# Patient Record
Sex: FEMALE | Race: WHITE | Employment: OTHER | URBAN - METROPOLITAN AREA
[De-identification: names, ages, dates, MRNs, and addresses within clinical notes are randomized per-mention and may not be internally consistent; named-entity substitution may affect disease eponyms.]

---

## 2017-02-21 ENCOUNTER — HOSPITAL ENCOUNTER (OUTPATIENT)
Dept: SURGERY | Age: 72
Discharge: HOME | End: 2017-02-21
Attending: OPHTHALMOLOGY | Admitting: OPHTHALMOLOGY

## 2017-02-21 VITALS
DIASTOLIC BLOOD PRESSURE: 85 MMHG | HEART RATE: 76 BPM | TEMPERATURE: 99.1 F | OXYGEN SATURATION: 100 % | SYSTOLIC BLOOD PRESSURE: 146 MMHG | RESPIRATION RATE: 18 BRPM

## 2017-02-21 RX ORDER — LANOLIN ALCOHOL/MO/W.PET/CERES
5 CREAM (GRAM) TOPICAL NIGHTLY PRN
Status: ON HOLD | COMMUNITY
End: 2022-05-29 | Stop reason: HOSPADM

## 2017-02-21 RX ORDER — SODIUM CHLORIDE 0.9 % (FLUSH) 0.9 %
10 SYRINGE (ML) INJECTION PRN
Status: DISCONTINUED | OUTPATIENT
Start: 2017-02-21 | End: 2017-02-22 | Stop reason: HOSPADM

## 2017-02-21 RX ORDER — SODIUM CHLORIDE 0.9 % (FLUSH) 0.9 %
10 SYRINGE (ML) INJECTION EVERY 12 HOURS SCHEDULED
Status: DISCONTINUED | OUTPATIENT
Start: 2017-02-21 | End: 2017-02-22 | Stop reason: HOSPADM

## 2017-02-21 RX ORDER — SODIUM CHLORIDE, SODIUM LACTATE, POTASSIUM CHLORIDE, CALCIUM CHLORIDE 600; 310; 30; 20 MG/100ML; MG/100ML; MG/100ML; MG/100ML
INJECTION, SOLUTION INTRAVENOUS CONTINUOUS
Status: DISCONTINUED | OUTPATIENT
Start: 2017-02-21 | End: 2017-02-22 | Stop reason: HOSPADM

## 2017-02-21 RX ORDER — LIDOCAINE HYDROCHLORIDE 10 MG/ML
1 INJECTION, SOLUTION EPIDURAL; INFILTRATION; INTRACAUDAL; PERINEURAL
Status: COMPLETED | OUTPATIENT
Start: 2017-02-21 | End: 2017-02-21

## 2017-02-21 RX ADMIN — SODIUM CHLORIDE, SODIUM LACTATE, POTASSIUM CHLORIDE, CALCIUM CHLORIDE: 600; 310; 30; 20 INJECTION, SOLUTION INTRAVENOUS at 08:52

## 2017-02-21 RX ADMIN — LIDOCAINE HYDROCHLORIDE 0.1 ML: 10 INJECTION, SOLUTION EPIDURAL; INFILTRATION; INTRACAUDAL; PERINEURAL at 08:52

## 2017-02-21 ASSESSMENT — PAIN - FUNCTIONAL ASSESSMENT: PAIN_FUNCTIONAL_ASSESSMENT: 0-10

## 2017-02-21 ASSESSMENT — PAIN SCALES - GENERAL: PAINLEVEL_OUTOF10: 0

## 2017-03-21 ENCOUNTER — HOSPITAL ENCOUNTER (OUTPATIENT)
Dept: SURGERY | Age: 72
Discharge: HOME | End: 2017-03-21
Attending: OPHTHALMOLOGY | Admitting: OPHTHALMOLOGY

## 2017-03-21 VITALS
OXYGEN SATURATION: 95 % | TEMPERATURE: 98.1 F | HEART RATE: 68 BPM | WEIGHT: 200 LBS | SYSTOLIC BLOOD PRESSURE: 148 MMHG | BODY MASS INDEX: 32.14 KG/M2 | RESPIRATION RATE: 16 BRPM | HEIGHT: 66 IN | DIASTOLIC BLOOD PRESSURE: 65 MMHG

## 2017-03-21 LAB
GLUCOSE BLD-MCNC: 138 MG/DL (ref 70–99)
PERFORMED ON: ABNORMAL

## 2017-03-21 RX ORDER — METOCLOPRAMIDE HYDROCHLORIDE 5 MG/ML
10 INJECTION INTRAMUSCULAR; INTRAVENOUS
Status: ACTIVE | OUTPATIENT
Start: 2017-03-21 | End: 2017-03-21

## 2017-03-21 RX ORDER — MIDAZOLAM HYDROCHLORIDE 1 MG/ML
1 INJECTION INTRAMUSCULAR; INTRAVENOUS PRN
Status: DISCONTINUED | OUTPATIENT
Start: 2017-03-21 | End: 2017-03-22 | Stop reason: HOSPADM

## 2017-03-21 RX ORDER — SODIUM CHLORIDE 0.9 % (FLUSH) 0.9 %
10 SYRINGE (ML) INJECTION EVERY 12 HOURS SCHEDULED
Status: DISCONTINUED | OUTPATIENT
Start: 2017-03-21 | End: 2017-03-22 | Stop reason: HOSPADM

## 2017-03-21 RX ORDER — LABETALOL HYDROCHLORIDE 5 MG/ML
5 INJECTION, SOLUTION INTRAVENOUS EVERY 10 MIN PRN
Status: DISCONTINUED | OUTPATIENT
Start: 2017-03-21 | End: 2017-03-22 | Stop reason: HOSPADM

## 2017-03-21 RX ORDER — HYDRALAZINE HYDROCHLORIDE 20 MG/ML
5 INJECTION INTRAMUSCULAR; INTRAVENOUS EVERY 10 MIN PRN
Status: DISCONTINUED | OUTPATIENT
Start: 2017-03-21 | End: 2017-03-22 | Stop reason: HOSPADM

## 2017-03-21 RX ORDER — LIDOCAINE HYDROCHLORIDE 10 MG/ML
1 INJECTION, SOLUTION EPIDURAL; INFILTRATION; INTRACAUDAL; PERINEURAL
Status: ACTIVE | OUTPATIENT
Start: 2017-03-21 | End: 2017-03-21

## 2017-03-21 RX ORDER — SODIUM CHLORIDE, SODIUM LACTATE, POTASSIUM CHLORIDE, CALCIUM CHLORIDE 600; 310; 30; 20 MG/100ML; MG/100ML; MG/100ML; MG/100ML
INJECTION, SOLUTION INTRAVENOUS CONTINUOUS
Status: DISCONTINUED | OUTPATIENT
Start: 2017-03-21 | End: 2017-03-22 | Stop reason: HOSPADM

## 2017-03-21 RX ORDER — SODIUM CHLORIDE 0.9 % (FLUSH) 0.9 %
10 SYRINGE (ML) INJECTION PRN
Status: DISCONTINUED | OUTPATIENT
Start: 2017-03-21 | End: 2017-03-22 | Stop reason: HOSPADM

## 2017-03-21 RX ORDER — PROMETHAZINE HYDROCHLORIDE 25 MG/ML
6.25 INJECTION, SOLUTION INTRAMUSCULAR; INTRAVENOUS
Status: ACTIVE | OUTPATIENT
Start: 2017-03-21 | End: 2017-03-21

## 2017-03-21 RX ORDER — MEPERIDINE HYDROCHLORIDE 50 MG/ML
12.5 INJECTION INTRAMUSCULAR; INTRAVENOUS; SUBCUTANEOUS EVERY 5 MIN PRN
Status: DISCONTINUED | OUTPATIENT
Start: 2017-03-21 | End: 2017-03-22 | Stop reason: HOSPADM

## 2017-03-21 RX ORDER — MORPHINE SULFATE 2 MG/ML
1 INJECTION, SOLUTION INTRAMUSCULAR; INTRAVENOUS EVERY 5 MIN PRN
Status: DISCONTINUED | OUTPATIENT
Start: 2017-03-21 | End: 2017-03-22 | Stop reason: HOSPADM

## 2017-03-21 RX ORDER — OXYCODONE HYDROCHLORIDE 5 MG/1
10 TABLET ORAL PRN
Status: ACTIVE | OUTPATIENT
Start: 2017-03-21 | End: 2017-03-21

## 2017-03-21 RX ORDER — OXYCODONE HYDROCHLORIDE 5 MG/1
5 TABLET ORAL PRN
Status: ACTIVE | OUTPATIENT
Start: 2017-03-21 | End: 2017-03-21

## 2017-03-21 RX ORDER — MIDAZOLAM HYDROCHLORIDE 1 MG/ML
INJECTION INTRAMUSCULAR; INTRAVENOUS
Status: COMPLETED
Start: 2017-03-21 | End: 2017-03-21

## 2017-03-21 RX ORDER — DIPHENHYDRAMINE HYDROCHLORIDE 50 MG/ML
12.5 INJECTION INTRAMUSCULAR; INTRAVENOUS
Status: ACTIVE | OUTPATIENT
Start: 2017-03-21 | End: 2017-03-21

## 2017-03-21 RX ADMIN — MIDAZOLAM HYDROCHLORIDE 1 MG: 1 INJECTION INTRAMUSCULAR; INTRAVENOUS at 08:40

## 2017-03-21 RX ADMIN — SODIUM CHLORIDE, SODIUM LACTATE, POTASSIUM CHLORIDE, CALCIUM CHLORIDE: 600; 310; 30; 20 INJECTION, SOLUTION INTRAVENOUS at 08:30

## 2017-03-21 ASSESSMENT — PAIN - FUNCTIONAL ASSESSMENT: PAIN_FUNCTIONAL_ASSESSMENT: 0-10

## 2017-03-21 ASSESSMENT — PAIN SCALES - GENERAL: PAINLEVEL_OUTOF10: 0

## 2018-01-13 PROBLEM — E87.1 HYPONATREMIA: Status: ACTIVE | Noted: 2018-01-13

## 2021-10-07 ENCOUNTER — HOSPITAL ENCOUNTER (OUTPATIENT)
Dept: CT IMAGING | Age: 76
Discharge: HOME | End: 2021-10-07
Payer: MEDICARE

## 2021-10-07 VITALS
RESPIRATION RATE: 14 BRPM | WEIGHT: 200 LBS | HEIGHT: 67 IN | OXYGEN SATURATION: 97 % | BODY MASS INDEX: 31.39 KG/M2 | DIASTOLIC BLOOD PRESSURE: 79 MMHG | HEART RATE: 65 BPM | SYSTOLIC BLOOD PRESSURE: 142 MMHG | TEMPERATURE: 97 F

## 2021-10-07 DIAGNOSIS — N18.32 CHRONIC KIDNEY DISEASE (CKD) STAGE G3B/A3, MODERATELY DECREASED GLOMERULAR FILTRATION RATE (GFR) BETWEEN 30-44 ML/MIN/1.73 SQUARE METER AND ALBUMINURIA CREATININE RATIO GREATER THAN 300 MG/G (HCC): ICD-10-CM

## 2021-10-07 LAB
GLUCOSE BLD-MCNC: 139 MG/DL (ref 70–99)
HCT VFR BLD CALC: 40.8 % (ref 36–48)
HEMOGLOBIN: 13.6 G/DL (ref 12–16)
INR BLD: 1.03 (ref 0.88–1.12)
MCH RBC QN AUTO: 29 PG (ref 26–34)
MCHC RBC AUTO-ENTMCNC: 33.3 G/DL (ref 31–36)
MCV RBC AUTO: 87 FL (ref 80–100)
PDW BLD-RTO: 14.8 % (ref 12.4–15.4)
PERFORMED ON: ABNORMAL
PLATELET # BLD: 394 K/UL (ref 135–450)
PMV BLD AUTO: 7.2 FL (ref 5–10.5)
PROTHROMBIN TIME: 11.6 SEC (ref 9.9–12.7)
RBC # BLD: 4.69 M/UL (ref 4–5.2)
WBC # BLD: 10.6 K/UL (ref 4–11)

## 2021-10-07 PROCEDURE — 7100000010 HC PHASE II RECOVERY - FIRST 15 MIN

## 2021-10-07 PROCEDURE — 6360000002 HC RX W HCPCS: Performed by: STUDENT IN AN ORGANIZED HEALTH CARE EDUCATION/TRAINING PROGRAM

## 2021-10-07 PROCEDURE — 7100000011 HC PHASE II RECOVERY - ADDTL 15 MIN

## 2021-10-07 PROCEDURE — 77012 CT SCAN FOR NEEDLE BIOPSY: CPT

## 2021-10-07 PROCEDURE — 85610 PROTHROMBIN TIME: CPT

## 2021-10-07 PROCEDURE — 2709999900 CT BIOPSY RENAL

## 2021-10-07 PROCEDURE — 85027 COMPLETE CBC AUTOMATED: CPT

## 2021-10-07 PROCEDURE — 88300 SURGICAL PATH GROSS: CPT

## 2021-10-07 RX ORDER — FENTANYL CITRATE 50 UG/ML
INJECTION, SOLUTION INTRAMUSCULAR; INTRAVENOUS
Status: COMPLETED | OUTPATIENT
Start: 2021-10-07 | End: 2021-10-07

## 2021-10-07 RX ORDER — MIDAZOLAM HYDROCHLORIDE 5 MG/ML
INJECTION INTRAMUSCULAR; INTRAVENOUS
Status: COMPLETED | OUTPATIENT
Start: 2021-10-07 | End: 2021-10-07

## 2021-10-07 RX ORDER — HYDRALAZINE HYDROCHLORIDE 20 MG/ML
10 INJECTION INTRAMUSCULAR; INTRAVENOUS ONCE
Status: DISCONTINUED | OUTPATIENT
Start: 2021-10-07 | End: 2021-10-08 | Stop reason: HOSPADM

## 2021-10-07 RX ORDER — HYDRALAZINE HYDROCHLORIDE 20 MG/ML
INJECTION INTRAMUSCULAR; INTRAVENOUS
Status: COMPLETED | OUTPATIENT
Start: 2021-10-07 | End: 2021-10-07

## 2021-10-07 RX ADMIN — MIDAZOLAM HYDROCHLORIDE 1 MG: 5 INJECTION, SOLUTION INTRAMUSCULAR; INTRAVENOUS at 09:14

## 2021-10-07 RX ADMIN — Medication 25 MCG: at 09:25

## 2021-10-07 RX ADMIN — MIDAZOLAM HYDROCHLORIDE 0.5 MG: 5 INJECTION, SOLUTION INTRAMUSCULAR; INTRAVENOUS at 09:25

## 2021-10-07 RX ADMIN — Medication 50 MCG: at 09:14

## 2021-10-07 RX ADMIN — HYDRALAZINE HYDROCHLORIDE 10 MG: 20 INJECTION INTRAMUSCULAR; INTRAVENOUS at 09:12

## 2021-10-07 ASSESSMENT — PAIN SCALES - GENERAL: PAINLEVEL_OUTOF10: 0

## 2021-10-07 ASSESSMENT — PAIN - FUNCTIONAL ASSESSMENT: PAIN_FUNCTIONAL_ASSESSMENT: 0-10

## 2021-10-07 NOTE — SEDATION DOCUMENTATION
Pt arrived for image guided Renal nodule biopsy. Procedure explained including the risk and benefits of the procedure. All questions answered. Pt verbalizes understanding of the of procedure and states no more questions. Consent signed. Vital signs stable, labs, allergies, medications, and code status reviewed. No contraindications noted. Time out completed prior to procedure. Pt was place prone on the CT table. Pt was placed on all cardiac monitoring.        Vitals:    10/07/21 0905   BP: (!) 186/92   Pulse: 73   Resp: 21   Temp:    SpO2: 96%    (vital signs in table format)    Jovanna Score  2 - Able to move 4 extremities voluntarily on command  2 - BP+/- 20mmHg of normal  2 - Fully awake  2 - Able to maintain oxygen saturation >92% on room air  2 - Able to breathe deeply and cough freely    Allergies  Sulfamethoxazole-trimethoprim    Labs  Lab Results   Component Value Date    INR 1.03 10/07/2021    PROTIME 11.6 10/07/2021       Lab Results   Component Value Date    CREATININE 0.9 01/17/2018    BUN 17 01/17/2018     (L) 01/17/2018    K 4.6 01/17/2018    CL 96 (L) 01/17/2018    CO2 23 01/17/2018       Lab Results   Component Value Date    WBC 10.6 10/07/2021    HGB 13.6 10/07/2021    HCT 40.8 10/07/2021    MCV 87.0 10/07/2021     10/07/2021

## 2021-10-07 NOTE — SEDATION DOCUMENTATION
Image guided Renal biopsy completed by Dr. Cecelia Vasquez. Pt tolerated procedure without any signs or symptoms of distress. Vital signs stable. Report given  to Israel Wesley RN. Pt transported back to [facility] in stable condition via stretcher. Total Biopsy: 3  Received: Versed: 1.5 mg       Fentanyl: 75 mcg  Bandage to LEFT that is clean dry and intact. Patient to lay on back for 1 hour. Can d/c in 2 hours.  Please keep BP below 160 if goes above 160 please call for further orders    Vital Signs  Vitals:    10/07/21 0945   BP: (!) 155/78   Pulse: 79   Resp: 16   Temp:    SpO2: 94%    (vital signs in table format)    Post Jovanna  2 - Able to move 4 extremities voluntarily on command  2 - BP+/- 20mmHg of normal  2 - Fully awake  2 - Able to maintain oxygen saturation >92% on room air  2 - Able to breathe deeply and cough freely

## 2021-10-07 NOTE — BRIEF OP NOTE
Brief Postoperative Note    Nick Glynn  YOB: 1945  [id number]    Pre-operative Diagnosis: CKD    Post-operative Diagnosis: Same    Procedure: CT guided left renal biopsy    Anesthesia: Local and Moderate Sedation    Surgeons/Assistants: Arron Rush MD    Estimated Blood Loss: less than 5    Complications: None    Specimens: Was Obtained: 3 samples    Findings: Successful CT guided biopsy of left kidney    Plan:   - strict bedrest for 2 hours    Electronically signed by Arron Rush MD on 10/7/2021 at 9:48 AM

## 2021-10-07 NOTE — PRE SEDATION
Sedation Pre-Procedure Note    Patient Name: Chance Duffy   YOB: 1945  Room/Bed: Room/bed info not found  Medical Record Number: [id number]  Date: 10/7/2021   Time: 9:05 AM       Indication:  Pt is a 75y/o F with CKD here for random renal biopsy. Consent: I have discussed with the patient and/or the patient representative the indication, alternatives, and the possible risks and/or complications of the planned procedure and the anesthesia methods. The patient and/or patient representative appear to understand and agree to proceed. Vital Signs:   Vitals:    10/07/21 0738   BP: (!) 147/74   Pulse: 82   Resp: 16   Temp: 97 °F (36.1 °C)   SpO2: 94%       Past Medical History:   has a past medical history of Arthritis, Back complaints, Diabetes mellitus ([address] Hypertension, and Spinal stenosis. Past Surgical History:   has a past surgical history that includes Hysterectomy; joint replacement (Left); Cataract removal with implant (Left, 02/21/2017); and eye surgery (Right, 03/21/2017). Medications:   Scheduled Meds:   Continuous Infusions:   PRN Meds:   Home Meds:   Prior to Admission medications    Medication Sig Start Date End Date Taking? Authorizing Provider   amLODIPine (NORVASC) 5 MG tablet Take 1 tablet by mouth daily 1/18/18  Yes Yenifer Lacey MD   hydrOXYzine (ATARAX) 25 MG tablet Take 25 mg by mouth 2 times daily   Yes Historical Provider, MD   pravastatin (PRAVACHOL) 40 MG tablet Take 40 mg by mouth daily   Yes Historical Provider, MD   tiZANidine (ZANAFLEX) 4 MG tablet Take 4 mg by mouth every 6 hours as needed   Yes Historical Provider, MD   melatonin 3 MG TABS tablet Take 5 mg by mouth nightly as needed   Yes Historical Provider, MD   naloxone 4 MG/0.1ML LIQD nasal spray 1 spray by Nasal route as needed for Opioid Reversal 2/18/19   EVAN Graham - CNP   HYDROcodone-acetaminophen (NORCO) 5-325 MG per tablet Take 1 tablet by mouth every 6 hours as needed for Pain. Historical Provider, MD   cephALEXin (KEFLEX) 250 MG capsule Take 250 mg by mouth 4 times daily    Historical Provider, MD   Omega-3 Fatty Acids (FISH OIL PO) Take by mouth daily    Historical Provider, MD   metFORMIN (GLUCOPHAGE) 500 MG tablet Take 500 mg by mouth 2 times daily (with meals)    Historical Provider, MD   aspirin 81 MG tablet Take 81 mg by mouth daily    Historical Provider, MD     Coumadin Use Last 7 Days:  no  Antiplatelet drug therapy use last 7 days: no  Other anticoagulant use last 7 days: no  Additional Medication Information:  None      Pre-Sedation Documentation and Exam:   I have reviewed the patient's history and review of systems.     Mallampati Airway Assessment:  Mallampati Class II - (soft palate, fauces & uvula are visible)    Prior History of Anesthesia Complications:   none    ASA Classification:  Class 3 - A patient with severe systemic disease that limits activity but is not incapacitating    Sedation/ Anesthesia Plan:   intravenous sedation    Medications Planned:   midazolam (Versed) intravenously and fentanyl intravenously    Patient is an appropriate candidate for plan of sedation: yes    Electronically signed by Maribeth Peterson MD on 10/7/2021 at 9:05 AM

## 2021-10-07 NOTE — PROGRESS NOTES
D: pt back to [facility] from interventional radiology procedure. VSS, denies pain or nausea, aware of flat bedrest for 1 hour post procedure. L posterior flank dressing has small bloody shadowy drainage visible. Will continue to monitor.

## 2021-10-07 NOTE — PROGRESS NOTES
D: bedrest ended. Pt sitting up in bed, drinking diet ginger ale and eating jello, denies nausea or pain. Dressing drainage unchanged, VSS.

## 2022-05-26 ENCOUNTER — APPOINTMENT (OUTPATIENT)
Dept: GENERAL RADIOLOGY | Age: 77
DRG: 683 | End: 2022-05-26
Payer: MEDICARE

## 2022-05-26 ENCOUNTER — HOSPITAL ENCOUNTER (INPATIENT)
Age: 77
LOS: 3 days | Discharge: NURSING, REHAB OR LONG-TERM CARE | DRG: 683 | End: 2022-05-29
Attending: EMERGENCY MEDICINE | Admitting: INTERNAL MEDICINE
Payer: MEDICARE

## 2022-05-26 DIAGNOSIS — R62.7 FAILURE TO THRIVE IN ADULT: ICD-10-CM

## 2022-05-26 DIAGNOSIS — N17.9 AKI (ACUTE KIDNEY INJURY) (HCC): Primary | ICD-10-CM

## 2022-05-26 DIAGNOSIS — E86.0 DEHYDRATION: ICD-10-CM

## 2022-05-26 DIAGNOSIS — R77.8 ELEVATED TROPONIN: ICD-10-CM

## 2022-05-26 PROBLEM — R53.1 GENERAL WEAKNESS: Status: ACTIVE | Noted: 2022-05-26

## 2022-05-26 LAB
A/G RATIO: 1.7 (ref 1.1–2.2)
ALBUMIN SERPL-MCNC: 4.2 G/DL (ref 3.4–5)
ALP BLD-CCNC: 101 U/L (ref 40–129)
ALT SERPL-CCNC: 12 U/L (ref 10–40)
ANION GAP SERPL CALCULATED.3IONS-SCNC: 14 MMOL/L (ref 3–16)
AST SERPL-CCNC: 19 U/L (ref 15–37)
BASOPHILS ABSOLUTE: 0 K/UL (ref 0–0.2)
BASOPHILS RELATIVE PERCENT: 0.4 %
BILIRUB SERPL-MCNC: <0.2 MG/DL (ref 0–1)
BILIRUBIN URINE: NEGATIVE
BLOOD, URINE: ABNORMAL
BUN BLDV-MCNC: 37 MG/DL (ref 7–20)
CALCIUM SERPL-MCNC: 9.8 MG/DL (ref 8.3–10.6)
CHLORIDE BLD-SCNC: 98 MMOL/L (ref 99–110)
CLARITY: CLEAR
CO2: 20 MMOL/L (ref 21–32)
COLOR: YELLOW
CREAT SERPL-MCNC: 1.7 MG/DL (ref 0.6–1.2)
EKG ATRIAL RATE: 88 BPM
EKG DIAGNOSIS: NORMAL
EKG P AXIS: 17 DEGREES
EKG P-R INTERVAL: 152 MS
EKG Q-T INTERVAL: 354 MS
EKG QRS DURATION: 82 MS
EKG QTC CALCULATION (BAZETT): 428 MS
EKG R AXIS: -24 DEGREES
EKG T AXIS: 82 DEGREES
EKG VENTRICULAR RATE: 88 BPM
EOSINOPHILS ABSOLUTE: 0.2 K/UL (ref 0–0.6)
EOSINOPHILS RELATIVE PERCENT: 3.1 %
EPITHELIAL CELLS, UA: ABNORMAL /HPF (ref 0–5)
FINE CASTS, UA: ABNORMAL /LPF (ref 0–2)
GFR AFRICAN AMERICAN: 35
GFR NON-AFRICAN AMERICAN: 29
GLUCOSE BLD-MCNC: 141 MG/DL (ref 70–99)
GLUCOSE BLD-MCNC: 174 MG/DL (ref 70–99)
GLUCOSE URINE: NEGATIVE MG/DL
HCT VFR BLD CALC: 35.2 % (ref 36–48)
HEMOGLOBIN: 11.5 G/DL (ref 12–16)
HYALINE CASTS: ABNORMAL /LPF (ref 0–2)
KETONES, URINE: NEGATIVE MG/DL
LEUKOCYTE ESTERASE, URINE: NEGATIVE
LYMPHOCYTES ABSOLUTE: 1.9 K/UL (ref 1–5.1)
LYMPHOCYTES RELATIVE PERCENT: 23.7 %
MCH RBC QN AUTO: 28.4 PG (ref 26–34)
MCHC RBC AUTO-ENTMCNC: 32.8 G/DL (ref 31–36)
MCV RBC AUTO: 86.6 FL (ref 80–100)
MICROSCOPIC EXAMINATION: YES
MONOCYTES ABSOLUTE: 0.9 K/UL (ref 0–1.3)
MONOCYTES RELATIVE PERCENT: 11.2 %
MUCUS: ABNORMAL /LPF
NEUTROPHILS ABSOLUTE: 4.9 K/UL (ref 1.7–7.7)
NEUTROPHILS RELATIVE PERCENT: 61.6 %
NITRITE, URINE: NEGATIVE
PDW BLD-RTO: 13.9 % (ref 12.4–15.4)
PERFORMED ON: ABNORMAL
PH UA: 5.5 (ref 5–8)
PLATELET # BLD: 442 K/UL (ref 135–450)
PMV BLD AUTO: 7.5 FL (ref 5–10.5)
POTASSIUM SERPL-SCNC: 5 MMOL/L (ref 3.5–5.1)
PRO-BNP: 485 PG/ML (ref 0–449)
PROTEIN UA: 100 MG/DL
RBC # BLD: 4.06 M/UL (ref 4–5.2)
RBC UA: ABNORMAL /HPF (ref 0–4)
RENAL EPITHELIAL, UA: ABNORMAL /HPF (ref 0–1)
SODIUM BLD-SCNC: 132 MMOL/L (ref 136–145)
SPECIFIC GRAVITY UA: 1.01 (ref 1–1.03)
TOTAL PROTEIN: 6.7 G/DL (ref 6.4–8.2)
TROPONIN: 0.03 NG/ML
TROPONIN: 0.04 NG/ML
URINE REFLEX TO CULTURE: ABNORMAL
URINE TYPE: ABNORMAL
UROBILINOGEN, URINE: 0.2 E.U./DL
WBC # BLD: 8 K/UL (ref 4–11)
WBC UA: ABNORMAL /HPF (ref 0–5)

## 2022-05-26 PROCEDURE — 97530 THERAPEUTIC ACTIVITIES: CPT

## 2022-05-26 PROCEDURE — 2580000003 HC RX 258: Performed by: PHYSICIAN ASSISTANT

## 2022-05-26 PROCEDURE — 93010 ELECTROCARDIOGRAM REPORT: CPT | Performed by: INTERNAL MEDICINE

## 2022-05-26 PROCEDURE — 97535 SELF CARE MNGMENT TRAINING: CPT

## 2022-05-26 PROCEDURE — 1200000000 HC SEMI PRIVATE

## 2022-05-26 PROCEDURE — 71045 X-RAY EXAM CHEST 1 VIEW: CPT

## 2022-05-26 PROCEDURE — 83036 HEMOGLOBIN GLYCOSYLATED A1C: CPT

## 2022-05-26 PROCEDURE — 97166 OT EVAL MOD COMPLEX 45 MIN: CPT

## 2022-05-26 PROCEDURE — 97162 PT EVAL MOD COMPLEX 30 MIN: CPT

## 2022-05-26 PROCEDURE — 85025 COMPLETE CBC W/AUTO DIFF WBC: CPT

## 2022-05-26 PROCEDURE — 81001 URINALYSIS AUTO W/SCOPE: CPT

## 2022-05-26 PROCEDURE — 99285 EMERGENCY DEPT VISIT HI MDM: CPT

## 2022-05-26 PROCEDURE — 80053 COMPREHEN METABOLIC PANEL: CPT

## 2022-05-26 PROCEDURE — 97116 GAIT TRAINING THERAPY: CPT

## 2022-05-26 PROCEDURE — 84484 ASSAY OF TROPONIN QUANT: CPT

## 2022-05-26 PROCEDURE — 83880 ASSAY OF NATRIURETIC PEPTIDE: CPT

## 2022-05-26 PROCEDURE — 6370000000 HC RX 637 (ALT 250 FOR IP): Performed by: INTERNAL MEDICINE

## 2022-05-26 PROCEDURE — 2580000003 HC RX 258: Performed by: INTERNAL MEDICINE

## 2022-05-26 PROCEDURE — 36415 COLL VENOUS BLD VENIPUNCTURE: CPT

## 2022-05-26 PROCEDURE — 93005 ELECTROCARDIOGRAM TRACING: CPT | Performed by: PHYSICIAN ASSISTANT

## 2022-05-26 RX ORDER — SODIUM CHLORIDE 9 MG/ML
INJECTION, SOLUTION INTRAVENOUS PRN
Status: DISCONTINUED | OUTPATIENT
Start: 2022-05-26 | End: 2022-05-29 | Stop reason: HOSPADM

## 2022-05-26 RX ORDER — ACETAMINOPHEN 325 MG/1
650 TABLET ORAL EVERY 6 HOURS PRN
Status: DISCONTINUED | OUTPATIENT
Start: 2022-05-26 | End: 2022-05-29 | Stop reason: HOSPADM

## 2022-05-26 RX ORDER — AMLODIPINE BESYLATE 5 MG/1
5 TABLET ORAL DAILY
Status: DISCONTINUED | OUTPATIENT
Start: 2022-05-26 | End: 2022-05-29 | Stop reason: HOSPADM

## 2022-05-26 RX ORDER — SODIUM CHLORIDE 9 MG/ML
INJECTION, SOLUTION INTRAVENOUS CONTINUOUS
Status: ACTIVE | OUTPATIENT
Start: 2022-05-26 | End: 2022-05-27

## 2022-05-26 RX ORDER — ACETAMINOPHEN 650 MG/1
650 SUPPOSITORY RECTAL EVERY 6 HOURS PRN
Status: DISCONTINUED | OUTPATIENT
Start: 2022-05-26 | End: 2022-05-29 | Stop reason: HOSPADM

## 2022-05-26 RX ORDER — SODIUM CHLORIDE 0.9 % (FLUSH) 0.9 %
5-40 SYRINGE (ML) INJECTION PRN
Status: DISCONTINUED | OUTPATIENT
Start: 2022-05-26 | End: 2022-05-29 | Stop reason: HOSPADM

## 2022-05-26 RX ORDER — POLYETHYLENE GLYCOL 3350 17 G/17G
17 POWDER, FOR SOLUTION ORAL DAILY PRN
Status: DISCONTINUED | OUTPATIENT
Start: 2022-05-26 | End: 2022-05-29 | Stop reason: HOSPADM

## 2022-05-26 RX ORDER — LANOLIN ALCOHOL/MO/W.PET/CERES
4.5 CREAM (GRAM) TOPICAL NIGHTLY PRN
Status: DISCONTINUED | OUTPATIENT
Start: 2022-05-26 | End: 2022-05-29 | Stop reason: HOSPADM

## 2022-05-26 RX ORDER — ONDANSETRON 4 MG/1
4 TABLET, ORALLY DISINTEGRATING ORAL EVERY 8 HOURS PRN
Status: DISCONTINUED | OUTPATIENT
Start: 2022-05-26 | End: 2022-05-29 | Stop reason: HOSPADM

## 2022-05-26 RX ORDER — ONDANSETRON 2 MG/ML
4 INJECTION INTRAMUSCULAR; INTRAVENOUS EVERY 6 HOURS PRN
Status: DISCONTINUED | OUTPATIENT
Start: 2022-05-26 | End: 2022-05-29 | Stop reason: HOSPADM

## 2022-05-26 RX ORDER — HYDROXYZINE HYDROCHLORIDE 10 MG/1
25 TABLET, FILM COATED ORAL 2 TIMES DAILY
Status: DISCONTINUED | OUTPATIENT
Start: 2022-05-26 | End: 2022-05-27 | Stop reason: DRUGHIGH

## 2022-05-26 RX ORDER — PRAVASTATIN SODIUM 40 MG
40 TABLET ORAL DAILY
Status: DISCONTINUED | OUTPATIENT
Start: 2022-05-27 | End: 2022-05-29 | Stop reason: HOSPADM

## 2022-05-26 RX ORDER — 0.9 % SODIUM CHLORIDE 0.9 %
1000 INTRAVENOUS SOLUTION INTRAVENOUS ONCE
Status: COMPLETED | OUTPATIENT
Start: 2022-05-26 | End: 2022-05-26

## 2022-05-26 RX ORDER — SODIUM CHLORIDE 0.9 % (FLUSH) 0.9 %
5-40 SYRINGE (ML) INJECTION EVERY 12 HOURS SCHEDULED
Status: DISCONTINUED | OUTPATIENT
Start: 2022-05-26 | End: 2022-05-29 | Stop reason: HOSPADM

## 2022-05-26 RX ORDER — ENOXAPARIN SODIUM 100 MG/ML
30 INJECTION SUBCUTANEOUS DAILY
Status: DISCONTINUED | OUTPATIENT
Start: 2022-05-27 | End: 2022-05-29 | Stop reason: HOSPADM

## 2022-05-26 RX ADMIN — AMLODIPINE BESYLATE 5 MG: 5 TABLET ORAL at 18:42

## 2022-05-26 RX ADMIN — HYDROXYZINE HYDROCHLORIDE 25 MG: 10 TABLET ORAL at 20:36

## 2022-05-26 RX ADMIN — SODIUM CHLORIDE 1000 ML: 9 INJECTION, SOLUTION INTRAVENOUS at 17:33

## 2022-05-26 RX ADMIN — SODIUM CHLORIDE: 9 INJECTION, SOLUTION INTRAVENOUS at 18:17

## 2022-05-26 ASSESSMENT — ENCOUNTER SYMPTOMS
BACK PAIN: 0
VOMITING: 0
NAUSEA: 0
CHEST TIGHTNESS: 0
SHORTNESS OF BREATH: 0
ABDOMINAL PAIN: 0
COUGH: 0

## 2022-05-26 ASSESSMENT — PAIN - FUNCTIONAL ASSESSMENT: PAIN_FUNCTIONAL_ASSESSMENT: NONE - DENIES PAIN

## 2022-05-26 NOTE — ED NOTES
Patient identified as a positive fall risk on the ED triage fall screening. Patient placed in fall precautions which includes:  yellow fall risk bracelet on wrist and yellow socks on feet. Patient instructed on importance of not getting out of bed or ambulating without assistance for safety. Pt verbalized understanding.        Zandra So RN  05/26/22 3699

## 2022-05-26 NOTE — ED NOTES
Pt scores as a fall risk. Pt has non skid socks, fall band and bed alarm in use. Pt call light within reach. Pt alert and able to verbalize needs and follow commands at this time.       Yair Bolden RN  05/26/22 6326

## 2022-05-26 NOTE — PROGRESS NOTES
Occupational Therapy  Facility/Department: [address] [place] [address]  Occupational Therapy Initial Assessment/Treatment    Name: Lynnette Barry  : 1945  MRN: [id number]  Date of Service: 2022    Discharge Recommendations:  [address]          Patient Diagnosis(es): The primary encounter diagnosis was DANNY (acute kidney injury) ([facility] Diagnoses of Dehydration, Failure to thrive in adult, and Elevated troponin were also pertinent to this visit. Past Medical History:  has a past medical history of Arthritis, Back complaints, Diabetes mellitus ([address] Hypertension, and Spinal stenosis. Past Surgical History:  has a past surgical history that includes Hysterectomy; joint replacement (Left); Cataract removal with implant (Left, 2017); eye surgery (Right, 2017); and CT BIOPSY RENAL (10/7/2021). Assessment   Performance deficits / Impairments: Decreased functional mobility ; Decreased ADL status; Decreased strength;Decreased safe awareness;Decreased cognition;Decreased balance;Decreased sensation;Decreased endurance;Decreased posture    Assessment: Pt is a 68yo female with reports to [facility] with generalized weakness after not being able to t/f off of the toilet. Pt reports mod I for functional mobility with 4WW and only recieves assistance with shower t/fs and showering as well as IADLS. Today, Pt performing functional t/fs mod-min A with RW. Pt initially mod Ax2 with HHA for mobility but progressing to CGA with RW. Pt requiring min A for toileting this date and CGA for standing self cares. Pt would continue to benefit from skilled OT services to increase balance, safety, endurance, strength and independence for ADLS and functional mobility.     Prognosis: Good  Decision Making: Medium Complexity  REQUIRES OT FOLLOW-UP: Yes  Activity Tolerance  Activity Tolerance: Patient Tolerated treatment well  Activity Tolerance Comments: Pt with increase SOB and fatigue with OOB mobility. HR increasing to 114 with activity. Plan   Plan  Times per Week: 3-5x/week  Current Treatment Recommendations: Strengthening,Balance training,Functional mobility training,Endurance training,Cognitive reorientation,Safety education & training,Patient/Caregiver education & training,Equipment evaluation, education, & procurement,Self-Care / ADL     Restrictions  Restrictions/Precautions  Restrictions/Precautions: General Precautions,Fall Risk  Position Activity Restriction  Other position/activity restrictions: RN cleared pt for therapy and OOB activity    Subjective   General  Chart Reviewed: Yes  Patient assessed for rehabilitation services?: Yes  Additional Pertinent Hx: dementia  Response to previous treatment: Patient with no complaints from previous session  Family / Caregiver Present: No  Referring Practitioner: SRAVANTHI Krueger  Diagnosis: generalized weakness  Subjective  Subjective: Pt in restroom with RN at start of session. Pt agreeable to therapy.   General Comment  Comments: RN approved therapy     Social/Functional History  Social/Functional History  Lives With: Son (Son cannot assist 24/7)  Type of Home: House  Home Layout: One level,Laundry in basement  Home Access: Ramped entrance  [address] - Number of Steps: 3  Entrance Stairs - Rails: Left  Bathroom Shower/Tub: Tub/Shower unit,Shower chair with back  Bathroom Toilet: Standard  Bathroom Equipment: Grab bars in shower,Shower chair,Grab bars around toilet  Home Equipment: Gloria Dem[place]ice, 4 wheeled,Cane, [place]  Has the patient had two or more falls in the past year or any fall with injury in the past year?: No  Receives Help From: Family  ADL Assistance: Needs assistance (Pt's daughter assist with bathing with varying levels assistance.)  Toileting: Independent  Homemaking Assistance: Needs assistance  Homemaking Responsibilities: Yes  Meal Prep Responsibility: Secondary (Uses microwave.)  Laundry Responsibility: No (daughter performs.)  Cleaning Responsibility: No (cleaning services.)  Shopping Responsibility: No (daughter performs.)  Ambulation Assistance: Independent (1AB used.)  Transfer Assistance: Needs assistance (Daughter assist with shower t/fs. Pt typically mod (i) with toilet t/fs. Pt reports to [facility] after being unable to stand up from toilet)  Active : No  Patient's  Info: daughter drives. Additional Comments: daughter can provide 24hr A if needed per pt. Pt may be a questionable historian. Per chart, pt has dementia. Objective   Heart Rate: 87  Heart Rate Source: Monitor  BP: (!) 160/76  BP Location: Right upper arm  Patient Position: Sitting  MAP (Calculated): 104  Resp: 18  SpO2: 93 %  O2 Device: None (Room air)  Vision Exceptions: Wears glasses for reading  Hearing: Within functional limits          Safety Devices  Type of Devices: Gait belt;Nurse notified; Left in bed (Pt left seated EOB with PT in room to perform PT evaluation.)  Bed Mobility Training  Bed Mobility Training: No  Balance  Sitting: Impaired  Sitting - Static: Good (unsupported)  Sitting - Dynamic: Fair (occasional) (CGA)  Standing: Impaired  Standing - Static: Occasional;Fair  Standing - Dynamic: Occasional;Fair (No AD, mod Ax2 with HHA and poor posture and safety awareness. With RW, CGA. Activities: to/from restroom, standing self-cares at sink, functional t/fs.)  Transfer Training  Transfer Training: Yes  Interventions: Verbal cues; Safety awareness training (RW used, vc's for safe hand placement.)  Sit to Stand:  Moderate assistance;Assist X1  Stand to Sit: Moderate assistance;Assist X1  Toilet Transfer: Minimum assistance;Assist X1 (use of grab bar on R and RW.)     AROM: Generally decreased, functional  PROM: Generally decreased, functional  Strength: Generally decreased, functional  Coordination: Within functional limits  Tone: Normal  Sensation: Impaired (B/L N/T in feet.) ADL  Grooming: Contact guard assistance (in stance at sink for hand hygiene.)  Toileting: Minimal assistance  Toileting Skilled Clinical Factors: on standard toilet with grab bar on R. No LB clothing management. Assist with t/fs     Activity Tolerance  Activity Tolerance: Patient tolerated evaluation without incident;Patient tolerated treatment well           Cognition  Overall Cognitive Status: Exceptions  Arousal/Alertness: Appropriate responses to stimuli  Following Commands: Follows one step commands with repetition  Attention Span: Attends with cues to redirect  Memory: Decreased short term memory  Safety Judgement: Decreased awareness of need for safety;Decreased awareness of need for assistance  Problem Solving: Decreased awareness of errors;Assistance required to identify errors made  Insights: Decreased awareness of deficits  Initiation: Requires cues for some  Sequencing: Requires cues for some   Ox2 - self and place                  Education Given To: Patient  Education Provided: Role of Therapy;Plan of Care;Transfer Training; Fall Prevention Strategies  Education Provided Comments: disease specific: safe t/fs  Education Method: Demonstration;Verbal  Barriers to Learning: Cognition  Education Outcome: Demonstrated understanding;Continued education needed;Verbalized understanding         AM-PAC Score        -[facility] Inpatient Daily Activity Raw Score: 17 (05/26/22 1528)  AM-PAC Inpatient ADL T-Scale Score : 37.26 (05/26/22 1528)  ADL Inpatient CMS 0-100% Score: 50.11 (05/26/22 1528)  ADL Inpatient CMS G-Code Modifier : CK (05/26/22 1528)    Goals  Short Term Goals  Time Frame for Short term goals: 1 week (6/2) unless stated otherwise. Short Term Goal 1: Pt will toilet with SBA and AD PRN. Short Term Goal 2: Pt will LB dress with Yuli and AD PRN (5/31). Short Term Goal 3: Pt will perform functional/toilet t/fs with SBA and AD PRN.   Patient Goals   Patient goals : \"to use the restroom\"       Therapy Time   Individual Concurrent Group Co-treatment   Time In 1432         Time Out 1505         Minutes 33         Timed Code Treatment Minutes: 23 Minutes (10minute evaluation)       Lawrence Carlisle OTR/L  If pt discharges prior to next session, this note will serve as discharge summary. See case management note for discharge disposition.

## 2022-05-26 NOTE — PROGRESS NOTES
Physical Therapy  Facility/Department: [address] [place] [address]  Physical Therapy Initial Assessment    Name: Jesus Salvador  : 1945  MRN: [id number]  Date of Service: 2022    Discharge Recommendations:  [address]        If pt discharges prior to next PT session this note will serve as discharge summary. Patient Diagnosis(es): The primary encounter diagnosis was DANNY (acute kidney injury) ([address] Diagnoses of Dehydration, Failure to thrive in adult, and Elevated troponin were also pertinent to this visit. Past Medical History:  has a past medical history of Arthritis, Back complaints, Diabetes mellitus ([address] Hypertension, and Spinal stenosis. Past Surgical History:  has a past surgical history that includes Hysterectomy; joint replacement (Left); Cataract removal with implant (Left, 2017); eye surgery (Right, 2017); and CT BIOPSY RENAL (10/7/2021). Assessment   Body Structures, Functions, Activity Limitations Requiring Skilled Therapeutic Intervention: Decreased functional mobility ; Increased pain;Decreased strength;Decreased endurance;Decreased safe awareness  Assessment: 67 yo fmeale seen in Emergency Dept with worsening weakness, abnormal labs. PLOF: lives with son, ramp to home, amb with 4WW indep. Son reporting increased weakness, diffiuclty arising from toilet. Today pt essentially min assist to CG for bed mob/trasnfers/ambulation, with early fatigue noted. Pt functioning below baseline and will benefit from skilled PT to address deficits. Recommend home SNF at discharge.   Treatment Diagnosis: weakness, generalized pain, decreased gait and mobility  Therapy Prognosis: Good  Decision Making: Medium Complexity  Barriers to Learning: decreased memory  Requires PT Follow-Up: Yes  Activity Tolerance  Activity Tolerance: Patient tolerated evaluation without incident;Patient tolerated treatment well     Plan   Plan: 3-5 times per week  Current Treatment Recommendations: Strengthening,Balance training,Functional mobility training,Transfer training,Gait training,Endurance training,Safety education & training,Pain management,Therapeutic activities  Safety Devices  Type of Devices:  All fall risk precautions in place,Call light within reach,Left in bed,Patient at risk for falls,Gait belt,Nurse notified     Restrictions  Restrictions/Precautions: General Precautions,Fall Risk  Other position/activity restrictions: RN cleared pt for therapy and OOB activity     Subjective   Chart Reviewed: Yes  Patient assessed for rehabilitation services?: Yes  Family / Caregiver Present: No  Referring Practitioner: Ruchi FISHMAN  Referral Date : 05/26/22  Diagnosis: worsening weakness and dementia, multiple abnormal lab values  Follows Commands: Within Functional Limits  General Comment  Comments: RN cleared pt for therapy  Subjective  Subjective: Pt agrees to therapy, reports \"uncomfortable\" chronic generalized pain         Social/Functional History  Social/Functional History  Lives With: Son (Son cannot assist 24/7)  Type of Home: House  Home Layout: One level,Laundry in basement  Home Access: Ramped entrance  [address] [facility] - Number of Steps: 3  Entrance Stairs - Rails: Left  Bathroom Shower/Tub: Tub/Shower unit,Shower chair with back  Bathroom Toilet: Standard  Bathroom Equipment: Grab bars in shower,Shower chair,Grab bars around toilet  Home Equipment: [place], 4 wheeled,Cane, [place]  Has the patient had two or more falls in the past year or any fall with injury in the past year?: No  Receives Help From: Family  ADL Assistance: Needs assistance (Pt's daughter assist with bathing with varying levels assistance.)  Toileting: Independent  Homemaking Assistance: Needs assistance  Homemaking Responsibilities: Yes  Meal Prep Responsibility: Secondary (Uses microwave.)  Laundry Responsibility: No (daughter performs.)  Cleaning Responsibility: No (cleaning services.)  Shopping Responsibility: No (daughter performs.)  Ambulation Assistance: Independent (2XT used.)  Transfer Assistance: Needs assistance (Daughter assist with shower t/fs. Pt typically mod (i) with toilet t/fs. Pt reports to [facility] after being unable to stand up from toilet)  Active : No  Patient's  Info: daughter drives. Additional Comments: daughter can provide 24hr A if needed per pt. Pt may be a questionable historian. Per chart, pt has dementia. Vision/Hearing  Vision Exceptions: Wears glasses for reading  Hearing: Within functional limits    Cognition     Oriented to place, self, situation and month, not year    Objective   Heart Rate: 87  Heart Rate Source: Monitor  BP: (!) 160/76  BP Location: Right upper arm  Patient Position: Sitting  MAP (Calculated): 104  Resp: 18  SpO2: 93 %  O2 Device: None (Room air)     General AROM: BLEs WFL  Strength : BLEs grossly Hip flexion 3+/5, abd 2+/5, knee ext 4-/5, DF 4-/5        Bed Mobility Training  Bed Mobility Training: No    Balance  Sitting: Impaired  Sitting - Static: Good (unsupported)  Sitting - Dynamic: Fair (occasional) (CGA)    Standing: Impaired  Standing - Static: Occasional;Fair  Standing - Dynamic: Occasional;Fair   Transfer Training  Transfer Training: Yes  Interventions: Verbal cues; Safety awareness training (RW used, vc's for safe hand placement.)  Sit to Stand: Moderate assistance;Assist X1  Stand to Sit: Moderate assistance;Assist X1    Bed mobility  Supine to Sit: Minimal assistance  Sit to Supine: Minimal assistance    Transfers  Sit to Stand:  Moderate Assistance  Stand to sit: Contact guard assistance  Ambulation  Surface: level tile  Device: Rolling Walker  Assistance: Contact guard assistance;Minimal assistance  Quality of Gait: slow pace, wide base of support, shortened stride, minimal foot clearance, no loss of balance  Distance: 25 ft x 2, early fatigue        Exercise Treatment:   Ankle pumps 10 x B    Heel slides 5 x B    SLR: 5 x B (indep but challenging for pt)      Quad sets: 10 x B     Gluteal sets: 10 x B    Hip abd: 5 x B assisted    Hip IR/ER: 5 x B      AM-PAC Score  AM-PAC Inpatient Mobility Raw Score : 16 (05/26/22 1524)  AM-PAC Inpatient T-Scale Score : 40.78 (05/26/22 1524)  Mobility Inpatient CMS 0-100% Score: 54.16 (05/26/22 1524)  Mobility Inpatient CMS G-Code Modifier : CK (05/26/22 1524)     Goals  Short Term Goals  Time Frame for Short term goals: 1 week (6/1) unless otherwise specified  Short term goal 1: pt to perform supine><sit SBA  Short term goal 2: pt to amb 120 ft with RW and SBA  Short term goal 3: pt to participate in LE Ex 10x each by 5/29  Patient Goals   Patient goals : \"to be able to get up from a chair and the toilet easier\"       Education  Patient Education  Education Given To: Patient  Education Provided: Role of Therapy;Plan of Care; Fall Prevention Strategies; Energy Conservation;Transfer Training  Education Provided Comments: Disease specific: pt educated in safety for transfers and ambulation.  She voices understanding, Recommend reinforcement  Education Method: Demonstration;Verbal  Barriers to Learning:  (decreased memory)  Education Outcome: Verbalized understanding;Continued education needed    Therapy Time   Individual Concurrent Group Co-treatment   Time In 1506         Time Out 1531         Minutes 25         Timed Code Treatment Minutes: [address], PT

## 2022-05-26 NOTE — ED PROVIDER NOTES
**ADVANCED PRACTICE PROVIDER, I HAVE EVALUATED THIS [facility]  ED  EMERGENCY DEPARTMENT ENCOUNTER      Pt Name: Donnice Siemens  WNE:[id number]  [place] 1945  Date of evaluation: 5/26/2022  Provider: SRAVANTHI Kapoor      Chief Complaint:    Chief Complaint   Patient presents with    Illness     EMS called by family for pt \"worsening dementia\". Pt arrives to ED alert and oriented to self, place, situation. confused as to the year. Pt denies shortness of breath, chest pain or other complaints         Nursing Notes, Past Medical Hx, Past Surgical Hx, Social Hx, Allergies, and Family Hx were all reviewed and agreed with or any disagreements were addressed in the HPI.    HPI: (Location, Duration, Timing, Severity, Quality, Assoc Sx, Context, Modifying factors)    Chief Complaint of worsening dementia, generalized weakness. This is a  68 y.o. female who presents via EMS from home with past medical history of diabetes, hypertensi and early signs of dementia. Patient currently lives at home with her son. The patient denies any pain or complaints at this time. I did speak with her son, Melisa Frazier who reports the patient has had worsening generalized weakness and fatigue. He states that she was unable to get off the toilet today therefore EMS was called. He states this happened twice. The person she sat on the toilet for over 3 hours. The second time was over 1 hour. He states the patient normally walks however has been too weak over the past several days to ambulate. He states that his sister and brother are in charge of medical POA and have been attempting to contact the [place] [facility] for assistance with home health or skilled nursing facility placement.      PastMedical/Surgical History:      Diagnosis Date    Arthritis     Back complaints     spinal stenosis    Diabetes mellitus ([facility] [address]     Hypertension     Spinal stenosis          Procedure Laterality Date    CATARACT REMOVAL WITH IMPLANT Left 02/21/2017    CT BIOPSY RENAL  10/7/2021    CT BIOPSY RENAL 10/7/2021 [facility] SCAN    EYE SURGERY Right 03/21/2017    [employer] of catarct with IOL implant    HYSTERECTOMY      JOINT REPLACEMENT Left     knee       Medications:  Previous Medications    AMLODIPINE (NORVASC) 5 MG TABLET    Take 1 tablet by mouth daily    ASPIRIN 81 MG TABLET    Take 81 mg by mouth daily    CEPHALEXIN (KEFLEX) 250 MG CAPSULE    Take 250 mg by mouth 4 times daily    HYDROCODONE-ACETAMINOPHEN (NORCO) 5-325 MG PER TABLET    Take 1 tablet by mouth every 6 hours as needed for Pain. HYDROXYZINE (ATARAX) 25 MG TABLET    Take 25 mg by mouth 2 times daily    MELATONIN 3 MG TABS TABLET    Take 5 mg by mouth nightly as needed    METFORMIN (GLUCOPHAGE) 500 MG TABLET    Take 500 mg by mouth 2 times daily (with meals)    NALOXONE 4 MG/0.1ML LIQD NASAL SPRAY    1 spray by Nasal route as needed for Opioid Reversal    OMEGA-3 FATTY ACIDS (FISH OIL PO)    Take by mouth daily    PRAVASTATIN (PRAVACHOL) 40 MG TABLET    Take 40 mg by mouth daily    TIZANIDINE (ZANAFLEX) 4 MG TABLET    Take 4 mg by mouth every 6 hours as needed         Review of Systems:  (2-9 systems needed)  Review of Systems   Constitutional: Negative for chills and fever. HENT: Negative for congestion. Eyes: Negative for visual disturbance. Respiratory: Negative for cough, chest tightness and shortness of breath. Cardiovascular: Negative for chest pain and palpitations. Gastrointestinal: Negative for abdominal pain, nausea and vomiting. Genitourinary: Negative for dysuria, frequency and urgency. Musculoskeletal: Negative for back pain. Skin: Negative for rash. Neurological: Positive for weakness. Negative for dizziness, light-headedness and headaches. Physical Exam:  Physical Exam  Vitals and nursing note reviewed. Constitutional:       Appearance: Normal appearance. She is not diaphoretic.    HENT:      Head: Normocephalic and atraumatic. Nose: Nose normal.      Mouth/Throat:      Mouth: Mucous membranes are moist.   Eyes:      General:         Right eye: No discharge. Left eye: No discharge. Extraocular Movements: Extraocular movements intact. Pupils: Pupils are equal, round, and reactive to light. Cardiovascular:      Rate and Rhythm: Normal rate and regular rhythm. Pulses: Normal pulses. Heart sounds: Normal heart sounds. No murmur heard. No friction rub. No gallop. Pulmonary:      Effort: Pulmonary effort is normal. No respiratory distress. Breath sounds: Normal breath sounds. No stridor. No wheezing, rhonchi or rales. Abdominal:      General: Abdomen is flat. There is no distension. Palpations: Abdomen is soft. Tenderness: There is no abdominal tenderness. There is no guarding or rebound. Musculoskeletal:         General: Normal range of motion. Cervical back: Normal range of motion and neck supple. Right lower leg: Edema present. Left lower leg: Edema present. Skin:     General: Skin is warm and dry. Coloration: Skin is not pale. Neurological:      Mental Status: She is alert and oriented to person, place, and time.    Psychiatric:         Mood and Affect: Mood normal.         Behavior: Behavior normal.         MEDICAL DECISION MAKING    Vitals:    Vitals:    05/26/22 1252 05/26/22 1425 05/26/22 1450   BP: 100/79 (!) 152/79 (!) 152/87   Pulse: 93 88 (!) 105   Resp: 20 18    Temp: 98.9 °F (37.2 °C)     TempSrc: Oral     SpO2: 96% 97% 93%   Weight: 200 lb (90.7 kg)     Height: 5' 5\" (1.651 m)         LABS:  Labs Reviewed   COMPREHENSIVE METABOLIC PANEL - Abnormal; Notable for the following components:       Result Value    Sodium 132 (*)     Chloride 98 (*)     CO2 20 (*)     Glucose 141 (*)     BUN 37 (*)     CREATININE 1.7 (*)     GFR Non- 29 (*)     GFR  35 (*)     All other components within normal limits   CBC WITH AUTO DIFFERENTIAL - Abnormal; Notable for the following components:    Hemoglobin 11.5 (*)     Hematocrit 35.2 (*)     All other components within normal limits   URINALYSIS WITH REFLEX TO CULTURE - Abnormal; Notable for the following components:    Blood, Urine SMALL (*)     Protein,  (*)     All other components within normal limits   BRAIN NATRIURETIC PEPTIDE - Abnormal; Notable for the following components:    Pro- (*)     All other components within normal limits   TROPONIN - Abnormal; Notable for the following components:    Troponin 0.03 (*)     All other components within normal limits   MICROSCOPIC URINALYSIS - Abnormal; Notable for the following components:    Mucus, UA 1+ (*)     Epithelial Cells, UA 6-10 (*)     Renal Epithelial, UA 2-5 (*)     All other components within normal limits        Remainder of labs reviewed and were negative at this time or not returned at the time of this note. RADIOLOGY:   Non-plain film images such as CT, Ultrasound and MRI are read by the radiologist. SRAVANTHI Nagy have directly visualized the radiologic plain film image(s) with the below findings:      Interpretation per the Radiologist below, if available at the time of this note:    XR CHEST PORTABLE   Final Result   Mild bilateral interstitial prominence which could reflect mild pulmonary   edema. Possible trace bilateral pleural effusions. MEDICAL DECISION MAKING / ED COURSE:      Patient was evaluated in the emergency department today after being unable to stand up off the toilet at home. She does not have any complaints or self. Her son reports he is concerned about her generalized weakness.     Work-up results include:  EKG which is interpreted by attending physician found abnormal sinus rhythm  Chest x-ray shows mild bilateral interstitial prominence which could reflect mild pulmonary edema versus possible trace bilateral pleural effusions. Without evidence of leukocytosis or acute anemia  CMP with sodium of 132, renal function with BUN of 37, creatinine 1.7 and GFR 29. No recent lab work to compare. Urinalysis nitrite and leukocyte negative. Patient was given:  Medications   0.9 % sodium chloride bolus (has no administration in time range)     Patient was given 1 L of IV fluids. I did order PT and OT consult versus skilled nursing facility placement. She was evaluated by social work as well. Given the patient's kidney function, with unknown baseline and recommending admission for further evaluation and treatment. She will ultimately require either home health or skilled nursing facility placement as her son is going into hospice next week and will be unable to care for her. The patient tolerated their visit well. I evaluated the patient. The physician was available for consultation as needed. The patient and / or the family were informed of the results of any tests, a time was given to answer questions, a plan was proposed and they agreed with plan. CLINICAL IMPRESSION:  1. DANNY (acute kidney injury) ([address]    2. Dehydration    3. Failure to thrive in adult    4.  Elevated troponin      Results for orders placed or performed during the hospital encounter of 05/26/22   Comprehensive Metabolic Panel   Result Value Ref Range    Sodium 132 (L) 136 - 145 mmol/L    Potassium 5.0 3.5 - 5.1 mmol/L    Chloride 98 (L) 99 - 110 mmol/L    CO2 20 (L) 21 - 32 mmol/L    Anion Gap 14 3 - 16    Glucose 141 (H) 70 - 99 mg/dL    BUN 37 (H) 7 - 20 mg/dL    CREATININE 1.7 (H) 0.6 - 1.2 mg/dL    GFR Non-African American 29 (A) >60    GFR  35 (A) >60    Calcium 9.8 8.3 - 10.6 mg/dL    Total Protein 6.7 6.4 - 8.2 g/dL    Albumin 4.2 3.4 - 5.0 g/dL    Albumin/Globulin Ratio 1.7 1.1 - 2.2    Total Bilirubin <0.2 0.0 - 1.0 mg/dL    Alkaline Phosphatase 101 40 - 129 U/L    ALT 12 10 - 40 U/L    AST 19 15 - 37 U/L   CBC with Auto Differential   Result Value Ref Range    WBC 8.0 4.0 - 11.0 K/uL    RBC 4.06 4.00 - 5.20 M/uL    Hemoglobin 11.5 (L) 12.0 - 16.0 g/dL    Hematocrit 35.2 (L) 36.0 - 48.0 %    MCV 86.6 80.0 - 100.0 fL    MCH 28.4 26.0 - 34.0 pg    MCHC 32.8 31.0 - 36.0 g/dL    RDW 13.9 12.4 - 15.4 %    Platelets 510 734 - 862 K/uL    MPV 7.5 5.0 - 10.5 fL    Neutrophils % 61.6 %    Lymphocytes % 23.7 %    Monocytes % 11.2 %    Eosinophils % 3.1 %    Basophils % 0.4 %    Neutrophils Absolute 4.9 1.7 - 7.7 K/uL    Lymphocytes Absolute 1.9 1.0 - 5.1 K/uL    Monocytes Absolute 0.9 0.0 - 1.3 K/uL    Eosinophils Absolute 0.2 0.0 - 0.6 K/uL    Basophils Absolute 0.0 0.0 - 0.2 K/uL   Urinalysis with Reflex to Culture    Specimen: Urine   Result Value Ref Range    Color, UA Yellow Straw/Yellow    Clarity, UA Clear Clear    Glucose, Ur Negative Negative mg/dL    Bilirubin Urine Negative Negative    Ketones, Urine Negative Negative mg/dL    Specific Gravity, UA 1.015 1.005 - 1.030    Blood, Urine SMALL (A) Negative    pH, UA 5.5 5.0 - 8.0    Protein,  (A) Negative mg/dL    Urobilinogen, Urine 0.2 <2.0 E.U./dL    Nitrite, Urine Negative Negative    Leukocyte Esterase, Urine Negative Negative    Microscopic Examination YES     Urine Type NotGiven     Urine Reflex to Culture Not Indicated    Brain Natriuretic Peptide   Result Value Ref Range    Pro- (H) 0 - 449 pg/mL   Troponin   Result Value Ref Range    Troponin 0.03 (H) <0.01 ng/mL   Microscopic Urinalysis   Result Value Ref Range    Hyaline Casts, UA 0-2 0 - 2 /LPF    Fine Casts, UA 0-2 0 - 2 /LPF    Mucus, UA 1+ (A) None Seen /LPF    WBC, UA 0-2 0 - 5 /HPF    RBC, UA 3-4 0 - 4 /HPF    Epithelial Cells, UA 6-10 (A) 0 - 5 /HPF    Renal Epithelial, UA 2-5 (A) 0 - 1 /HPF   EKG 12 Lead   Result Value Ref Range    Ventricular Rate 88 BPM    Atrial Rate 88 BPM    P-R Interval 152 ms    QRS Duration 82 ms    Q-T Interval 354 ms    QTc Calculation (Bazett) 428 ms    P Axis 17 degrees    R Axis -24 degrees    T Axis 82 degrees    Diagnosis       Normal sinus rhythmModerate voltage criteria for LVH, may be normal variantBorderline ECGNo previous ECGs available       I spoke with [facility]. [facility] hospitalist. We discussed the history, physical exam, diagnostics, as well as their emergency department course. Based upon that discussion, we've decided to admit Lynnette Barry for further observation and evaluation of Lulu Batista's   1. DANNY (acute kidney injury) ([address]    2. Dehydration    3. Failure to thrive in adult    4. Elevated troponin    . DISPOSITION Decision To Admit 05/26/2022 03:14:46 PM      PATIENT REFERRED TO:  No follow-up provider specified.     DISCHARGE MEDICATIONS:  New Prescriptions    No medications on file       DISCONTINUED MEDICATIONS:  Discontinued Medications    No medications on file              (Please note the MDM and HPI sections of this note were completed with a voice recognition program.  Efforts were made to edit the dictations but occasionally words are mis-transcribed.)    Electronically signed, Gera Brunson,           Gera Brunson  05/26/22 7276

## 2022-05-26 NOTE — ACP (ADVANCE CARE PLANNING)
Advance Care Planning     General Advance Care Planning (ACP) Conversation    Date of Conversation: 5/26/2022  Conducted with: Patient with Decision Making Capacity   Healthcare Decision Maker: Named in Advance Directive or Healthcare Power of  (name) sonLissette:    Primary Decision Maker: Destinee Jansen - [phone]  Click here to complete Healthcare Decision Makers including selection of the Healthcare Decision Maker Relationship (ie \"Primary\"). Today we documented Decision Maker(s) consistent with Legal Next of Kin hierarchy.       Length of Voluntary ACP Conversation in minutes:  <16 minutes (Non-Billable)    JOSE Anderson

## 2022-05-26 NOTE — PROGRESS NOTES
Received report from [facility] in [facility].

## 2022-05-26 NOTE — H&P
Hospital Medicine History & Physical      PCP: Miki Francis MD    Date of Admission: 5/26/2022    Date of Service: Pt seen/examined on 5/26/22 and Admitted to Inpatient with expected LOS greater than two midnights due to medical therapy. Chief Complaint:   I was acting strange      History Of Present Illness:      68 y.o. female with hx of dm, htn, oa, spinal stenosis who presented to [facility] with change in mentation/weaknss. Pt was noted to be sleeping a lot of late and today around 430am, she attempted to use the restroom but was unable to get off the toilet. Her son(who is a HD pt with chronic weakness) called EMS who helped her back to bed. She again attempted to use the restroom this afternoon and again EMS was called. Pt however appeared confused and decision was made to bring her in for evaluation. Past Medical History:          Diagnosis Date    Arthritis     Back complaints     spinal stenosis    Diabetes mellitus ([address]     Hypertension     Spinal stenosis        Past Surgical History:          Procedure Laterality Date    CATARACT REMOVAL WITH IMPLANT Left 02/21/2017    CT BIOPSY RENAL  10/7/2021    CT BIOPSY RENAL [facility]/7/2021 [facility]    EYE SURGERY Right 03/21/2017    [employer] of catarct with IOL implant    HYSTERECTOMY      JOINT REPLACEMENT Left     knee       Medications Prior to Admission:      Prior to Admission medications    Medication Sig Start Date End Date Taking? Authorizing Provider   naloxone 4 MG/0.1ML LIQD nasal spray 1 spray by Nasal route as needed for Opioid Reversal 2/18/19   EVAN Haddad - CNP   amLODIPine (NORVASC) 5 MG tablet Take 1 tablet by mouth daily 1/18/18   Kecia Lopes MD   HYDROcodone-acetaminophen (NORCO) 5-325 MG per tablet Take 1 tablet by mouth every 6 hours as needed for Pain.   Patient not taking: Reported on 5/26/2022    Historical Provider, MD   hydrOXYzine (ATARAX) 25 MG tablet Take 25 mg by mouth 2 times daily    Historical Provider, MD   pravastatin (PRAVACHOL) 40 MG tablet Take 40 mg by mouth daily    Historical Provider, MD   cephALEXin (KEFLEX) 250 MG capsule Take 250 mg by mouth 4 times daily    Historical Provider, MD   tiZANidine (ZANAFLEX) 4 MG tablet Take 4 mg by mouth every 6 hours as needed    Historical Provider, MD   melatonin 3 MG TABS tablet Take 5 mg by mouth nightly as needed    Historical Provider, MD   Omega-3 Fatty Acids (FISH OIL PO) Take by mouth daily    Historical Provider, MD   metFORMIN (GLUCOPHAGE) 500 MG tablet Take 500 mg by mouth 2 times daily (with meals)  Patient not taking: Reported on 5/26/2022    Historical Provider, MD   aspirin 81 MG tablet Take 81 mg by mouth daily  Patient not taking: Reported on 5/26/2022    Historical Provider, MD       Allergies:  Sulfamethoxazole-trimethoprim    Social History:      The patient currently lives at home    TOBACCO:   reports that she has never smoked. She has never used smokeless tobacco.  ETOH:   reports current alcohol use. E-Cigarettes/Vaping Use     Questions Responses    E-Cigarette/Vaping Use Never User    Start Date     Passive Exposure     Quit Date     Counseling Given     Comments             Family History:       Reviewed in detail and negative for DM, CAD, Cancer, CVA. Positive as follows:        Problem Relation Age of Onset    Stroke Mother     Heart Disease Father        REVIEW OF SYSTEMS COMPLETED:   Pertinent positives as noted in the HPI. All other systems reviewed and negative. PHYSICAL EXAM PERFORMED:    /62   Pulse 87   Temp 98.6 °F (37 °C) (Oral)   Resp 20   Ht 5' 5\" (1.651 m)   Wt 185 lb 8 oz (84.1 kg)   SpO2 93%   BMI 30.87 kg/m²     General appearance:  No apparent distress, appears stated age and cooperative. HEENT:  Normal cephalic, atraumatic without obvious deformity. Pupils equal, round, and reactive to light. Extra ocular muscles intact. Conjunctivae/corneas clear. Neck: Supple, with full range of motion.  No jugular venous distention. Trachea midline. Respiratory:  Normal respiratory effort. Clear to auscultation, bilaterally without Rales/Wheezes/Rhonchi. Cardiovascular:  Regular rate and rhythm with normal S1/S2 without murmurs, rubs or gallops. Abdomen: Soft, non-tender, non-distended with normal bowel sounds. Musculoskeletal:  No clubbing, cyanosis or edema bilaterally. Full range of motion without deformity. Skin: Skin color, texture, turgor normal.  No rashes or lesions. Neurologic:  Neurovascularly intact without any focal sensory/motor deficits. Cranial nerves: II-XII intact, grossly non-focal.  Psychiatric:  Alert and oriented, thought content appropriate, normal insight  Capillary Refill: Brisk,3 seconds, normal  Peripheral Pulses: +2 palpable, equal bilaterally       Labs:     Recent Labs     05/26/22  1256   WBC 8.0   HGB 11.5*   HCT 35.2*        Recent Labs     05/26/22  1256   *   K 5.0   CL 98*   CO2 20*   BUN 37*   CREATININE 1.7*   CALCIUM 9.8     Recent Labs     05/26/22  1256   AST 19   ALT 12   BILITOT <0.2   ALKPHOS 101     No results for input(s): INR in the last 72 hours. Recent Labs     05/26/22  1256   TROPONINI 0.03*       Urinalysis:      Lab Results   Component Value Date    NITRU Negative 05/26/2022    WBCUA 0-2 05/26/2022    BACTERIA Rare 01/13/2018    RBCUA 3-4 05/26/2022    BLOODU SMALL 05/26/2022    SPECGRAV 1.015 05/26/2022    GLUCOSEU Negative 05/26/2022       Radiology:     EKG:  I have reviewed the EKG with the following interpretation: nsr, rate of 88, left axis, no gross ischemic changes noted    XR CHEST PORTABLE   Final Result   Mild bilateral interstitial prominence which could reflect mild pulmonary   edema. Possible trace bilateral pleural effusions.              ASSESSMENT:    Active Hospital Problems    Diagnosis Date Noted    General weakness [R53.1] 05/26/2022     Priority: Medium         PLAN:    gen weakness- unclear etiology, possibly element of danny and volume depletion  -tx underlying issues  -pt/ot ordered  -trended guy    DANNY- suspect volume depletion, baseline cr 0.8 , 1.7 on arrival  -ivfs given  -Monitored labs    DM2- per emr  -No longer on metformin  -No longer on asa  -Ac/hs bs  -Check a1c    HTN- stable  -On norvasc    HLD- defer mgmt to oupt, on statin    OA/Spinal stenosis- per chart, pt no longer taking norco, zanaflex    DVT Prophylaxis: lovenox renal dosing   Diet: ADULT DIET; Regular  Code Status: Limited(discussed with son and daughter Willis Flynn on phone)    PT/OT Eval Status: rec snf on 5/26    Dispo - pending improved renal fcn, likely by weekend       Kennedy Darden MD    Thank you Asael Shaw MD for the opportunity to be involved in this patient's care. If you have any questions or concerns please feel free to contact me at [phone].

## 2022-05-26 NOTE — ED PROVIDER NOTES
I independently performed a history and physical on Lulu Batista. All diagnostic, treatment, and disposition decisions were made by myself in conjunction with the advanced practice provider. EKG: NSR, rate 88, leftward axis, QTC within normal limits, no acute ST or T wave abnormalities, no priors    Patient with generalized weakness, unable to safely ambulate around the house. Admitted to the hospitalist service. Will likely require placement. Possible mild DANNY. For further details of [facility] emergency department encounter, please see Eric Caballero's documentation.              Biju Spears MD  05/26/22 [id number]

## 2022-05-27 LAB
ANION GAP SERPL CALCULATED.3IONS-SCNC: 14 MMOL/L (ref 3–16)
BASOPHILS ABSOLUTE: 0 K/UL (ref 0–0.2)
BASOPHILS RELATIVE PERCENT: 0.4 %
BUN BLDV-MCNC: 35 MG/DL (ref 7–20)
CALCIUM SERPL-MCNC: 9.4 MG/DL (ref 8.3–10.6)
CHLORIDE BLD-SCNC: 98 MMOL/L (ref 99–110)
CO2: 19 MMOL/L (ref 21–32)
CREAT SERPL-MCNC: 2 MG/DL (ref 0.6–1.2)
CREATININE URINE: 67 MG/DL (ref 28–259)
EOSINOPHILS ABSOLUTE: 0.2 K/UL (ref 0–0.6)
EOSINOPHILS RELATIVE PERCENT: 2.7 %
ESTIMATED AVERAGE GLUCOSE: 177.2 MG/DL
GFR AFRICAN AMERICAN: 29
GFR NON-AFRICAN AMERICAN: 24
GLUCOSE BLD-MCNC: 121 MG/DL (ref 70–99)
GLUCOSE BLD-MCNC: 123 MG/DL (ref 70–99)
GLUCOSE BLD-MCNC: 144 MG/DL (ref 70–99)
GLUCOSE BLD-MCNC: 173 MG/DL (ref 70–99)
GLUCOSE BLD-MCNC: 193 MG/DL (ref 70–99)
HBA1C MFR BLD: 7.8 %
HCT VFR BLD CALC: 35.1 % (ref 36–48)
HEMOGLOBIN: 11.4 G/DL (ref 12–16)
INR BLD: 1.21 (ref 0.87–1.14)
LYMPHOCYTES ABSOLUTE: 1.6 K/UL (ref 1–5.1)
LYMPHOCYTES RELATIVE PERCENT: 22.5 %
MCH RBC QN AUTO: 28.8 PG (ref 26–34)
MCHC RBC AUTO-ENTMCNC: 32.6 G/DL (ref 31–36)
MCV RBC AUTO: 88.3 FL (ref 80–100)
MONOCYTES ABSOLUTE: 0.9 K/UL (ref 0–1.3)
MONOCYTES RELATIVE PERCENT: 12.1 %
NEUTROPHILS ABSOLUTE: 4.4 K/UL (ref 1.7–7.7)
NEUTROPHILS RELATIVE PERCENT: 62.3 %
PDW BLD-RTO: 14.4 % (ref 12.4–15.4)
PERFORMED ON: ABNORMAL
PLATELET # BLD: 436 K/UL (ref 135–450)
PMV BLD AUTO: 7 FL (ref 5–10.5)
POTASSIUM REFLEX MAGNESIUM: 4.7 MMOL/L (ref 3.5–5.1)
PROTEIN PROTEIN: 147 MG/DL
PROTEIN/CREAT RATIO: 2.2 MG/DL
PROTHROMBIN TIME: 15.1 SEC (ref 11.7–14.5)
RBC # BLD: 3.97 M/UL (ref 4–5.2)
SARS-COV-2, NAAT: NOT DETECTED
SODIUM BLD-SCNC: 131 MMOL/L (ref 136–145)
SODIUM BLD-SCNC: 133 MMOL/L (ref 136–145)
SODIUM URINE: 50 MMOL/L
TROPONIN: 0.04 NG/ML
WBC # BLD: 7.1 K/UL (ref 4–11)

## 2022-05-27 PROCEDURE — 84300 ASSAY OF URINE SODIUM: CPT

## 2022-05-27 PROCEDURE — 36592 COLLECT BLOOD FROM PICC: CPT

## 2022-05-27 PROCEDURE — 85610 PROTHROMBIN TIME: CPT

## 2022-05-27 PROCEDURE — 1200000000 HC SEMI PRIVATE

## 2022-05-27 PROCEDURE — 84295 ASSAY OF SERUM SODIUM: CPT

## 2022-05-27 PROCEDURE — 82570 ASSAY OF URINE CREATININE: CPT

## 2022-05-27 PROCEDURE — 84156 ASSAY OF PROTEIN URINE: CPT

## 2022-05-27 PROCEDURE — 85025 COMPLETE CBC W/AUTO DIFF WBC: CPT

## 2022-05-27 PROCEDURE — 80048 BASIC METABOLIC PNL TOTAL CA: CPT

## 2022-05-27 PROCEDURE — 2580000003 HC RX 258: Performed by: INTERNAL MEDICINE

## 2022-05-27 PROCEDURE — 84484 ASSAY OF TROPONIN QUANT: CPT

## 2022-05-27 PROCEDURE — 6360000002 HC RX W HCPCS: Performed by: INTERNAL MEDICINE

## 2022-05-27 PROCEDURE — 6370000000 HC RX 637 (ALT 250 FOR IP): Performed by: INTERNAL MEDICINE

## 2022-05-27 PROCEDURE — 87635 SARS-COV-2 COVID-19 AMP PRB: CPT

## 2022-05-27 PROCEDURE — 36415 COLL VENOUS BLD VENIPUNCTURE: CPT

## 2022-05-27 RX ORDER — SODIUM CHLORIDE 9 MG/ML
25 INJECTION, SOLUTION INTRAVENOUS PRN
Status: DISCONTINUED | OUTPATIENT
Start: 2022-05-27 | End: 2022-05-29 | Stop reason: HOSPADM

## 2022-05-27 RX ORDER — VENLAFAXINE HYDROCHLORIDE 37.5 MG/1
37.5 CAPSULE, EXTENDED RELEASE ORAL DAILY
COMMUNITY
Start: 2018-08-20

## 2022-05-27 RX ORDER — SODIUM CHLORIDE 9 MG/ML
INJECTION, SOLUTION INTRAVENOUS CONTINUOUS
Status: DISCONTINUED | OUTPATIENT
Start: 2022-05-27 | End: 2022-05-28

## 2022-05-27 RX ORDER — SODIUM CHLORIDE 0.9 % (FLUSH) 0.9 %
5-40 SYRINGE (ML) INJECTION EVERY 12 HOURS SCHEDULED
Status: DISCONTINUED | OUTPATIENT
Start: 2022-05-27 | End: 2022-05-29 | Stop reason: HOSPADM

## 2022-05-27 RX ORDER — SODIUM CHLORIDE 0.9 % (FLUSH) 0.9 %
5-40 SYRINGE (ML) INJECTION PRN
Status: DISCONTINUED | OUTPATIENT
Start: 2022-05-27 | End: 2022-05-29 | Stop reason: HOSPADM

## 2022-05-27 RX ORDER — EZETIMIBE 10 MG/1
10 TABLET ORAL DAILY
COMMUNITY
Start: 2022-04-12

## 2022-05-27 RX ORDER — ALLOPURINOL 300 MG/1
300 TABLET ORAL DAILY
COMMUNITY
Start: 2022-04-04

## 2022-05-27 RX ORDER — VALSARTAN 160 MG/1
160 TABLET ORAL DAILY
COMMUNITY
Start: 2022-04-09

## 2022-05-27 RX ORDER — TORSEMIDE 10 MG/1
10 TABLET ORAL DAILY
Status: ON HOLD | COMMUNITY
Start: 2022-05-12 | End: 2022-05-29 | Stop reason: HOSPADM

## 2022-05-27 RX ORDER — LIDOCAINE HYDROCHLORIDE 10 MG/ML
5 INJECTION, SOLUTION INFILTRATION; PERINEURAL ONCE
Status: DISCONTINUED | OUTPATIENT
Start: 2022-05-27 | End: 2022-05-29 | Stop reason: HOSPADM

## 2022-05-27 RX ORDER — GABAPENTIN 300 MG/1
300 CAPSULE ORAL 3 TIMES DAILY
COMMUNITY
Start: 2022-04-24

## 2022-05-27 RX ORDER — REPAGLINIDE 0.5 MG/1
0.5 TABLET ORAL 3 TIMES DAILY
COMMUNITY
Start: 2022-04-12

## 2022-05-27 RX ORDER — HYDROXYZINE HYDROCHLORIDE 10 MG/1
25 TABLET, FILM COATED ORAL 3 TIMES DAILY
Status: DISCONTINUED | OUTPATIENT
Start: 2022-05-27 | End: 2022-05-29 | Stop reason: HOSPADM

## 2022-05-27 RX ADMIN — SODIUM CHLORIDE: 9 INJECTION, SOLUTION INTRAVENOUS at 21:50

## 2022-05-27 RX ADMIN — AMLODIPINE BESYLATE 5 MG: 5 TABLET ORAL at 08:27

## 2022-05-27 RX ADMIN — PRAVASTATIN SODIUM 40 MG: 40 TABLET ORAL at 08:27

## 2022-05-27 RX ADMIN — ACETAMINOPHEN 650 MG: 325 TABLET ORAL at 21:54

## 2022-05-27 RX ADMIN — ENOXAPARIN SODIUM 30 MG: 100 INJECTION SUBCUTANEOUS at 08:27

## 2022-05-27 RX ADMIN — HYDROXYZINE HYDROCHLORIDE 25 MG: 10 TABLET ORAL at 21:49

## 2022-05-27 RX ADMIN — HYDROXYZINE HYDROCHLORIDE 25 MG: 10 TABLET ORAL at 08:27

## 2022-05-27 RX ADMIN — HYDROXYZINE HYDROCHLORIDE 25 MG: 10 TABLET ORAL at 15:27

## 2022-05-27 ASSESSMENT — PAIN DESCRIPTION - LOCATION: LOCATION: BACK

## 2022-05-27 ASSESSMENT — PAIN SCALES - GENERAL: PAINLEVEL_OUTOF10: 6

## 2022-05-27 ASSESSMENT — PAIN DESCRIPTION - DESCRIPTORS: DESCRIPTORS: ACHING

## 2022-05-27 NOTE — DISCHARGE INSTR - COC
Continuity of Care Form    Patient Name: Eloy Gonzalez   :  1945  MRN:  [id number]    Admit date:  2022  Discharge date:  ***    Code Status Order: Limited   Advance Directives:      Admitting Physician:  Cornell Chapa MD  PCP: Carlos Carranza MD    Discharging Nurse: [facility] Unit/Room#: 6888/7466-69  Discharging Unit Phone Number: ***    Emergency Contact:   Extended Emergency Contact Information  Primary Emergency Contact: [address] [place] [address] Phone: [phone]  Relation: Child  Secondary Emergency Contact: Lashawn Bower, [address] Phone: [phone]  Relation: Child    Past Surgical History:  Past Surgical History:   Procedure Laterality Date    CATARACT REMOVAL WITH IMPLANT Left 2017    CT BIOPSY RENAL  10/7/2021    CT BIOPSY RENAL 10/7/2021 [facility] CT SCAN    EYE SURGERY Right 2017    [employer] of catarct with IOL implant    HYSTERECTOMY      JOINT REPLACEMENT Left     knee       Immunization History: There is no immunization history on file for this patient.     Active Problems:  Patient Active Problem List   Diagnosis Code    Degeneration of cervical intervertebral disc M50.30    Displacement of cervical intervertebral disc without myelopathy M50.20    Displacement of lumbar intervertebral disc without myelopathy M51.26    Degeneration of lumbar or lumbosacral intervertebral disc M51.37    Lumbar stenosis M48.061    Lumbar facet arthropathy M47.816    Lumbar degenerative disc disease M51.36    Facet arthropathy, cervical M47.812    Cervical stenosis of spinal canal M48.02    Hyponatremia E87.1    General weakness R53.1       Isolation/Infection:   Isolation            No Isolation          Patient Infection Status       None to display            Nurse Assessment:  Last Vital Signs: /64   Pulse 79   Temp 98.3 °F (36.8 °C) (Oral)   Resp 18   Ht 5' 5\" (1.651 m)   Wt 185 lb 8 oz (84.1 kg)   SpO2 94%   BMI 30.87 kg/m²     Last documented pain score (0-10 scale):    Last Weight:   Wt Readings from Last 1 Encounters:   22 185 lb 8 oz (84.1 kg)     Mental Status:  {IP PT MENTAL STATUS:}    IV Access:  [employer] IV ACCESS:886366495}    Nursing Mobility/ADLs:  Walking   {CHP DME YMPA:027373376}  Transfer  {CHP DME UNZL:043422858}  Bathing  {CHP DME IELK:168594186}  Dressing  {CHP DME BUNU:996810227}  Toileting  {CHP DME OTFV:825944558}  Feeding  {CHP DME VUD}  Med Admin  {CHP DME MEWX:072702714}  Med Delivery   { LILIANA MED Delivery:440165065}    Wound Care Documentation and Therapy:        Elimination:  Continence: Bowel: {YES / SO:23728}  Bladder: {YES / NT:75084}  Urinary Catheter: {Urinary Catheter:704350050}   Colostomy/Ileostomy/Ileal Conduit: {YES / QL:54298}       Date of Last BM: ***    Intake/Output Summary (Last 24 hours) at 2022 0749  Last data filed at 2022 0311  Gross per 24 hour   Intake 240 ml   Output 500 ml   Net -260 ml     I/O last 3 completed shifts:   In: 240 [P.O.:240]  Out: 500 [Urine:500]    Safety Concerns:     [employer] Safety Concerns:822629983}    Impairments/Disabilities:      [employer] Impairments/Disabilities:671273855}    Nutrition Therapy:  Current Nutrition Therapy:   [employer] Diet List:042288796}    Routes of Feeding: {CHP DME Other Feedings:400150635}  Liquids: {Slp liquid thickness:59437}  Daily Fluid Restriction: {CHP DME Yes amt example:027884957}  Last Modified Barium Swallow with Video (Video Swallowing Test): {Done Not Done WJPV:214333981}    Treatments at the Time of Hospital Discharge:   Respiratory Treatments: ***  Oxygen Therapy:  {Therapy; copd oxygen:83450}  Ventilator:    { CC Vent LCBQ:309368446}    Rehab Therapies: {THERAPEUTIC INTERVENTION:6733484298}  Weight Bearing Status/Restrictions: [address] [place] Weight Bearin}  Other Medical Equipment (for information only, NOT a DME order):  {EQUIPMENT:516397455}  Other Treatments: ***    Patient's personal belongings (please select all that are sent with patient):  {CHP DME Belongings:686725100}    RN SIGNATURE:  {Esignature:608640028}    CASE MANAGEMENT/SOCIAL WORK SECTION    Inpatient Status Date: 5/26/22    Readmission Risk Assessment Score:  Readmission Risk              Risk of Unplanned Readmission:  14           Discharging to Facility/ Agency   Name: [facility]  Address:  Phone: [phone]  Fax: [phone]    / signature: Electronically signed by Leonardo Edmond RN on 5/29/22 at 10:14 AM EDT    PHYSICIAN SECTION    Prognosis: Fair    Condition at Discharge: Stable    Rehab Potential (if transferring to Rehab): Fair    Recommended Labs or Other Treatments After Discharge: Follow up with nephrology as outpatient(discuss when to restart amlodipine). Physician Certification: I certify the above information and transfer of David Martinez  is necessary for the continuing treatment of the diagnosis listed and that she requires [place] for less 30 days.      Update Admission H&P: No change in H&P    PHYSICIAN SIGNATURE:  Electronically signed by Arlie Hashimoto, MD on 5/29/22 at 9:58 AM EDT

## 2022-05-27 NOTE — PROGRESS NOTES
Hospitalist Progress Note      PCP: Sigrid Barbosa MD    Date of Admission: 5/26/2022    Chief Complaint:   I was acting [facility] Course: reviewed     Subjective:  Feels well, sitting on bedside       Medications:  Reviewed    Infusion Medications    sodium chloride       Scheduled Medications    [Held by provider] amLODIPine  5 mg Oral Daily    [Held by provider] hydrOXYzine  25 mg Oral BID    pravastatin  40 mg Oral Daily    sodium chloride flush  5-40 mL IntraVENous 2 times per day    enoxaparin  30 mg SubCUTAneous Daily     PRN Meds: melatonin, sodium chloride flush, sodium chloride, ondansetron **OR** ondansetron, polyethylene glycol, acetaminophen **OR** acetaminophen      Intake/Output Summary (Last 24 hours) at 5/27/2022 0931  Last data filed at 5/27/2022 3048  Gross per 24 hour   Intake 240 ml   Output 850 ml   Net -610 ml       Physical Exam Performed:    /64   Pulse 79   Temp 98.3 °F (36.8 °C) (Oral)   Resp 18   Ht 5' 5\" (1.651 m)   Wt 185 lb 8 oz (84.1 kg)   SpO2 94%   BMI 30.87 kg/m²     General appearance: No apparent distress, appears stated age and cooperative. HEENT: Pupils equal, round, and reactive to light. Conjunctivae/corneas clear. Neck: Supple, with full range of motion. No jugular venous distention. Trachea midline. Respiratory:  Normal respiratory effort. Clear to auscultation, bilaterally without Rales/Wheezes/Rhonchi. Cardiovascular: Regular rate and rhythm with normal S1/S2 without murmurs, rubs or gallops. Abdomen: Soft, non-tender, non-distended with normal bowel sounds. Musculoskeletal: No clubbing, cyanosis or edema bilaterally. Full range of motion without deformity. Skin: Skin color, texture, turgor normal.  No rashes or lesions. Neurologic:  Neurovascularly intact without any focal sensory/motor deficits.  Cranial nerves: II-XII intact, grossly non-focal.  Psychiatric: Alert and oriented, thought content appropriate, normal insight  Capillary Refill: Brisk,3 seconds, normal   Peripheral Pulses: +2 palpable, equal bilaterally       Labs:   Recent Labs     05/26/22  1256 05/27/22  0428   WBC 8.0 7.1   HGB 11.5* 11.4*   HCT 35.2* 35.1*    436     Recent Labs     05/26/22  1256 05/27/22  0428   * 131*   K 5.0 4.7   CL 98* 98*   CO2 20* 19*   BUN 37* 35*   CREATININE 1.7* 2.0*   CALCIUM 9.8 9.4     Recent Labs     05/26/22  1256   AST 19   ALT 12   BILITOT <0.2   ALKPHOS 101     No results for input(s): INR in the last 72 hours. Recent Labs     05/26/22  1256 05/26/22  2236 05/27/22  0428   TROPONINI 0.03* 0.04* 0.04*       Urinalysis:      Lab Results   Component Value Date    NITRU Negative 05/26/2022    WBCUA 0-2 05/26/2022    BACTERIA Rare 01/13/2018    RBCUA 3-4 05/26/2022    BLOODU SMALL 05/26/2022    SPECGRAV 1.015 05/26/2022    GLUCOSEU Negative 05/26/2022       Radiology:  XR CHEST PORTABLE   Final Result   Mild bilateral interstitial prominence which could reflect mild pulmonary   edema. Possible trace bilateral pleural effusions. Assessment/Plan:    Active Hospital Problems    Diagnosis     General weakness [R53.1]      Priority: Medium         gen weakness- unclear etiology, possibly element of danny and volume depletion  -tx underlying issues  -pt/ot ordered  -trended guy and stable(likely from danny)     DANNY- suspect volume depletion, baseline cr 0.8 , 1.7 on arrival  -ivfs given  -Monitored labs   -nephro (pt sees Dr. John Sage) consulted given lack of improvement    DM2- per emr, somewhat controlled  -No longer on metformin  -No longer on asa  -Ac/hs bs  -Check a1c and 7.8     HTN- stable  -On norvasc     HLD- defer mgmt to oupt, on statin     OA/Spinal stenosis- per chart, pt no longer taking norco, zanaflex     DVT Prophylaxis: lovenox renal dosing   Diet: ADULT DIET;  Regular  Code Status: Limited    PT/OT Eval Status: rec SNF on 5/26    Dispo - unclear, nephro consulted    Kirk Quarles MD

## 2022-05-27 NOTE — PROGRESS NOTES
Thank you for consulting [facility] in the care of your patient. A full consult will follow but if you have any questions I can be reached through our office at [phone] or through [facility]. Thank you.   Dr. Sunitha Scott

## 2022-05-27 NOTE — PROGRESS NOTES
Physical Therapy    Physical therapy attempted to treat this patient. Patient refused to participate. Patient educated on the benefits of mobility but patient continued to refuse. PT will re-attempt to treat this patient as able. Thank you.      Bailey Tobar PT

## 2022-05-27 NOTE — PROGRESS NOTES
For patient safety, an [employer]AS[employer] camera has been placed in patient's room. [employer] monitoring needed for Confusion and Increased Fall Risk. Writer placed call to monitor observer to verify camera number 13 and review patient name, reason for monitoring, and contact information for primary RN. Patient notified of use of remote monitoring upon implementation of camera and verbalized understanding.        Alexi Morales, RN

## 2022-05-27 NOTE — PROGRESS NOTES
MD notified: Pt trop 0.03 on admission. pt admitted with DANNY and given fluid, unsure of baseline trops. No repeats orderd at this time, do we need repeat trops? Pt w/o cp.

## 2022-05-27 NOTE — CONSULTS
Patient Name: Anny Conchis                                                    Primary Physician: Kirk Quarles MD  Admitting Dx: Dehydration [E86.0]  General weakness [R53.1]  Elevated troponin [R77.8]  Failure to thrive in adult [R62.7]  DANNY (acute kidney injury) [facility]) [N17.9]    Nephrology [address]  [email or web address] [employer]                                          Plan:     · IVF NS restarted at 75 cc/hr. · Urine studies ordered. · Holding neurontin, Demadex, and Diovan. Assessment:     DANNY w/ CKD ? · Worsening SCr now 2.0 from baseline of 0.9 in 2018. Previous CT guided renal biopsy 10/2021 per Dr. John Sage with severe interstitial fibrosis, tubular atrophy and moderate arterionephrosclerosis. · Holding Demadex and Diovan with IVF ordered. · Denies NSAID use at home. · BP was low on admission now fluctuating. Leave on Norvasc but holding meds noted above. · UA shows small amount of blood and protein but no casts and SG of 1.015. No evidence of infection. Hyponatremia  · Hx with NSAID and ACEi. Holding diuretics. · Started on IVF NS.   · Follow levels q 8 hrs. AMS  · Holding Neurontin which is a common cause of AMS especially in renal insufficiency. · Jeff Cliche may be contributing as well. Please call our office at [phone] or [employer] with any questions or contact me directly. Subjective:     CC / Reason for Consult: DANNY w/ Hyponatremia    HPI / PMHx:  This is a consult for Anny Balderrama  requested by Kirk Quarles MD for the reason of  DANNY. Anny Conchis is a 68 y.o. female admitted for DANNY w/ FTT with PMhx  Hyponatremia, Oesity, DM, HTN, Spinal Stenosis, Arthritis. Previously seen with low Na of 118 in 2018. Hx of low sodium at 131 in 2017 and severe low sodium in 2008 at 118. Previously was on Lisinopril and Naprosyn. No SSRI/TCAs or thiazides.   She was seen at [facility] with hypercalcemia and altered mental status and  Na of 121 years ago. Now she presented to [facility] with change in mentation/weaknss. Pt was noted to be sleeping a lot, she attempted to use the restroom but was unable to get off the toilet. Her son called EMS who helped her back to bed. She again attempted to use the restroom this afternoon and again EMS was called. Pt  was confused and she was brougth in for evaluation. SCr at that time was 1.7 and rising to 2.0. Previously baseline of 0.9 in 2018. She is feeling much better now. Appetite is good, no N/V/D. I thank Dr. Jean Heller MD for consulting [facility] [address] [facility] in the care of your patient. Please call with any questions or concerns. [phone]. Nikki Calderon    Objective:     SocHx: No hx of smoking or drinking.      FamHx: Son is on dialysis with heart disease and HTN. Dad  with heart disease.   Mom  with stroke.      Current Medications:  Scheduled Medications:  [Held by provider] amLODIPine, 5 mg, Daily  [Held by provider] hydrOXYzine, 25 mg, BID  pravastatin, 40 mg, Daily  sodium chloride flush, 5-40 mL, 2 times per day  enoxaparin, 30 mg, Daily       IV Meds:  sodium chloride       PRN Medications:  melatonin, 4.5 mg, Nightly PRN  sodium chloride flush, 5-40 mL, PRN  sodium chloride, , PRN  ondansetron, 4 mg, Q8H PRN   Or  ondansetron, 4 mg, Q6H PRN  polyethylene glycol, 17 g, Daily PRN  acetaminophen, 650 mg, Q6H PRN   Or  acetaminophen, 650 mg, Q6H PRN        Allergies: Sulfamethoxazole-trimethoprim    ROS: Positives in BOLD     GEN:   fevers, chills, sweats, fatigue and weight loss     HEENT:    nasal congestion, sore mouth and sore throat     Resp:    cough, hemoptysis, pneumonia or dyspnea on exertion     Card:  chest pain, chest pressure/discomfort, dyspnea, palpitations,  lower extremity edema     GI:   nausea, vomiting, diarrhea, constipation and abdominal pain     :  dysuria, nocturia, urinary incontinence, hesitancy and hematuria     Derm:   rash, skin lesion(s), pruritus and dryness     Neuro:   headaches, dizziness, seizures, gait problems, tremor and weakness     MS:  myalgias, arthralgias, neck pain and back pain     Endo:    nephropathy and cardiovascular disease    PE:      Gen: alert, well appearing, and in no distress and oriented to person, place, and time     HEENT:pupils equal and reactive, extraocular eye movements intact      Neuro: alert, oriented, normal speech, no focal findings or movement disorder noted     Neck:  supple, no significant adenopathy     Cardio: normal rate, regular rhythm, normal S1, S2, no murmurs, rubs, clicks or gallops      Resp: clear to auscultation, no wheezes, rales or rhonchi, symmetric air entry. GI:  soft, nontender, nondistended, no masses or organomegaly. Ext:  peripheral pulses normal, no pedal edema, no clubbing or cyanosis      MS: no joint tenderness, deformity or swelling      DERM: normal coloration and turgor, no rashesor bruising.        Vitals: Patient Vitals for the past 8 hrs:   BP Temp Temp src Pulse Resp SpO2   05/27/22 1131 (!) 156/66 98.7 °F (37.1 °C) Oral 83 16 93 %   05/27/22 0729 139/64 98.3 °F (36.8 °C) Oral 79 18 94 %          I/Os:     Intake/Output Summary (Last 24 hours) at 5/27/2022 1348  Last data filed at 5/27/2022 1131  Gross per 24 hour   Intake 240 ml   Output 1075 ml   Net -835 ml       LABS:    Lab Results   Component Value Date    CREATININE 2.0 05/27/2022    BUN 35 05/27/2022     05/27/2022    K 4.7 05/27/2022    CL 98 05/27/2022    CO2 19 05/27/2022     No results found for: [id number]   Lab Results   Component Value Date    WBC 7.1 05/27/2022    HGB 11.4 05/27/2022    HCT 35.1 05/27/2022    MCV 88.3 05/27/2022     05/27/2022      No results found for: IRON, TIBC, FERRITIN   No results found for: Janita Gosselin  Lab Results   Component Value Date    .6 01/15/2018    CALCIUM 9.4 05/27/2022    PHOS 3.9 01/13/2018

## 2022-05-28 LAB
ANION GAP SERPL CALCULATED.3IONS-SCNC: 12 MMOL/L (ref 3–16)
BACTERIA: ABNORMAL /HPF
BILIRUBIN URINE: NEGATIVE
BLOOD, URINE: ABNORMAL
BUN BLDV-MCNC: 33 MG/DL (ref 7–20)
CALCIUM SERPL-MCNC: 8.6 MG/DL (ref 8.3–10.6)
CHLORIDE BLD-SCNC: 103 MMOL/L (ref 99–110)
CLARITY: CLEAR
CO2: 20 MMOL/L (ref 21–32)
COLOR: YELLOW
CREAT SERPL-MCNC: 1.8 MG/DL (ref 0.6–1.2)
EPITHELIAL CELLS, UA: ABNORMAL /HPF (ref 0–5)
GFR AFRICAN AMERICAN: 33
GFR NON-AFRICAN AMERICAN: 27
GLUCOSE BLD-MCNC: 126 MG/DL (ref 70–99)
GLUCOSE BLD-MCNC: 141 MG/DL (ref 70–99)
GLUCOSE BLD-MCNC: 166 MG/DL (ref 70–99)
GLUCOSE BLD-MCNC: 170 MG/DL (ref 70–99)
GLUCOSE BLD-MCNC: 222 MG/DL (ref 70–99)
GLUCOSE URINE: NEGATIVE MG/DL
KETONES, URINE: NEGATIVE MG/DL
LEUKOCYTE ESTERASE, URINE: NEGATIVE
MICROSCOPIC EXAMINATION: YES
NITRITE, URINE: NEGATIVE
PERFORMED ON: ABNORMAL
PH UA: 6 (ref 5–8)
POTASSIUM SERPL-SCNC: 4.3 MMOL/L (ref 3.5–5.1)
PROTEIN UA: 100 MG/DL
RBC UA: ABNORMAL /HPF (ref 0–4)
SODIUM BLD-SCNC: 133 MMOL/L (ref 136–145)
SODIUM BLD-SCNC: 135 MMOL/L (ref 136–145)
SODIUM BLD-SCNC: 136 MMOL/L (ref 136–145)
SPECIFIC GRAVITY UA: 1.02 (ref 1–1.03)
URINE TYPE: ABNORMAL
UROBILINOGEN, URINE: 0.2 E.U./DL
WBC UA: ABNORMAL /HPF (ref 0–5)

## 2022-05-28 PROCEDURE — 80048 BASIC METABOLIC PNL TOTAL CA: CPT

## 2022-05-28 PROCEDURE — 6370000000 HC RX 637 (ALT 250 FOR IP): Performed by: INTERNAL MEDICINE

## 2022-05-28 PROCEDURE — 2580000003 HC RX 258: Performed by: INTERNAL MEDICINE

## 2022-05-28 PROCEDURE — 6360000002 HC RX W HCPCS: Performed by: INTERNAL MEDICINE

## 2022-05-28 PROCEDURE — 6370000000 HC RX 637 (ALT 250 FOR IP): Performed by: NURSE PRACTITIONER

## 2022-05-28 PROCEDURE — 36592 COLLECT BLOOD FROM PICC: CPT

## 2022-05-28 PROCEDURE — 1200000000 HC SEMI PRIVATE

## 2022-05-28 PROCEDURE — 84295 ASSAY OF SERUM SODIUM: CPT

## 2022-05-28 PROCEDURE — 81001 URINALYSIS AUTO W/SCOPE: CPT

## 2022-05-28 RX ORDER — VENLAFAXINE HYDROCHLORIDE 37.5 MG/1
37.5 CAPSULE, EXTENDED RELEASE ORAL
Status: DISCONTINUED | OUTPATIENT
Start: 2022-05-29 | End: 2022-05-29 | Stop reason: HOSPADM

## 2022-05-28 RX ORDER — EZETIMIBE 10 MG/1
10 TABLET ORAL NIGHTLY
Status: DISCONTINUED | OUTPATIENT
Start: 2022-05-28 | End: 2022-05-29 | Stop reason: HOSPADM

## 2022-05-28 RX ORDER — ALLOPURINOL 300 MG/1
300 TABLET ORAL DAILY
Status: DISCONTINUED | OUTPATIENT
Start: 2022-05-28 | End: 2022-05-29 | Stop reason: HOSPADM

## 2022-05-28 RX ORDER — BUTALBITAL, ACETAMINOPHEN AND CAFFEINE 50; 325; 40 MG/1; MG/1; MG/1
1 TABLET ORAL ONCE
Status: COMPLETED | OUTPATIENT
Start: 2022-05-28 | End: 2022-05-28

## 2022-05-28 RX ORDER — DIPHENHYDRAMINE HCL 25 MG
12.5 TABLET ORAL ONCE
Status: COMPLETED | OUTPATIENT
Start: 2022-05-29 | End: 2022-05-29

## 2022-05-28 RX ADMIN — Medication 10 ML: at 09:30

## 2022-05-28 RX ADMIN — Medication 10 ML: at 20:01

## 2022-05-28 RX ADMIN — HYDROXYZINE HYDROCHLORIDE 25 MG: 10 TABLET ORAL at 20:00

## 2022-05-28 RX ADMIN — BUTALBITAL, ACETAMINOPHEN, AND CAFFEINE 1 TABLET: 50; 325; 40 TABLET ORAL at 01:10

## 2022-05-28 RX ADMIN — Medication 4.5 MG: at 20:00

## 2022-05-28 RX ADMIN — Medication 10 ML: at 09:29

## 2022-05-28 RX ADMIN — ALLOPURINOL 300 MG: 300 TABLET ORAL at 16:51

## 2022-05-28 RX ADMIN — ACETAMINOPHEN 650 MG: 325 TABLET ORAL at 20:00

## 2022-05-28 RX ADMIN — ENOXAPARIN SODIUM 30 MG: 100 INJECTION SUBCUTANEOUS at 09:29

## 2022-05-28 RX ADMIN — ACETAMINOPHEN 650 MG: 325 TABLET ORAL at 09:31

## 2022-05-28 RX ADMIN — HYDROXYZINE HYDROCHLORIDE 25 MG: 10 TABLET ORAL at 09:29

## 2022-05-28 RX ADMIN — AMLODIPINE BESYLATE 5 MG: 5 TABLET ORAL at 09:29

## 2022-05-28 RX ADMIN — EZETIMIBE 10 MG: 10 TABLET ORAL at 20:01

## 2022-05-28 RX ADMIN — PRAVASTATIN SODIUM 40 MG: 40 TABLET ORAL at 09:29

## 2022-05-28 RX ADMIN — HYDROXYZINE HYDROCHLORIDE 25 MG: 10 TABLET ORAL at 14:36

## 2022-05-28 ASSESSMENT — PAIN DESCRIPTION - LOCATION
LOCATION: BACK
LOCATION: BACK
LOCATION: HEAD
LOCATION: HEAD

## 2022-05-28 ASSESSMENT — PAIN - FUNCTIONAL ASSESSMENT
PAIN_FUNCTIONAL_ASSESSMENT: ACTIVITIES ARE NOT PREVENTED
PAIN_FUNCTIONAL_ASSESSMENT: ACTIVITIES ARE NOT PREVENTED

## 2022-05-28 ASSESSMENT — PAIN SCALES - GENERAL
PAINLEVEL_OUTOF10: 5
PAINLEVEL_OUTOF10: 6
PAINLEVEL_OUTOF10: 7
PAINLEVEL_OUTOF10: 8

## 2022-05-28 ASSESSMENT — PAIN DESCRIPTION - DESCRIPTORS
DESCRIPTORS: ACHING;DISCOMFORT
DESCRIPTORS: ACHING
DESCRIPTORS: ACHING

## 2022-05-28 ASSESSMENT — PAIN DESCRIPTION - ONSET: ONSET: ON-GOING

## 2022-05-28 ASSESSMENT — PAIN DESCRIPTION - ORIENTATION
ORIENTATION: UPPER;ANTERIOR
ORIENTATION: MID

## 2022-05-28 ASSESSMENT — PAIN DESCRIPTION - FREQUENCY: FREQUENCY: CONTINUOUS

## 2022-05-28 ASSESSMENT — PAIN DESCRIPTION - PAIN TYPE: TYPE: ACUTE PAIN

## 2022-05-28 NOTE — PROGRESS NOTES
Upon arrival to place PICC line assessed chart for issues related to picc placement, check for consent, and did time out with RN Pierce Tena. Pt. Tolerated PICC placement well, no difficulty accessing basilic vein and 3CG technology used to verify PICC tip placement. Positive P wave with no negative deflection. Printed wave form and placed In chart.  Reported off to NEDA Preston

## 2022-05-28 NOTE — PROGRESS NOTES
Hospitalist Progress Note      PCP: Dallin Mcfadden MD    Date of Admission: 5/26/2022    Chief Complaint:   I was acting [facility] Course: reviewed      Subjective:  Feels well, sitting on bedside , nephrology team at bedside as well    Medications:  Reviewed    Infusion Medications    sodium chloride      sodium chloride Stopped (05/27/22 1020)     Scheduled Medications    hydrOXYzine  25 mg Oral TID    lidocaine 1 % injection  5 mL IntraDERmal Once    sodium chloride flush  5-40 mL IntraVENous 2 times per day    amLODIPine  5 mg Oral Daily    pravastatin  40 mg Oral Daily    sodium chloride flush  5-40 mL IntraVENous 2 times per day    enoxaparin  30 mg SubCUTAneous Daily     PRN Meds: sodium chloride flush, sodium chloride, melatonin, sodium chloride flush, sodium chloride, ondansetron **OR** ondansetron, polyethylene glycol, acetaminophen **OR** acetaminophen      Intake/Output Summary (Last 24 hours) at 5/28/2022 0883  Last data filed at 5/28/2022 0535  Gross per 24 hour   Intake 854.16 ml   Output 805 ml   Net 49.16 ml       Physical Exam Performed:    BP (!) 149/78   Pulse 71   Temp 98.1 °F (36.7 °C) (Oral)   Resp 18   Ht 5' 5\" (1.651 m)   Wt 186 lb 6.4 oz (84.6 kg)   SpO2 95%   BMI 31.02 kg/m²     General appearance: No apparent distress, appears stated age and cooperative. HEENT: Pupils equal, round, and reactive to light. Conjunctivae/corneas clear. Neck: Supple, with full range of motion. No jugular venous distention. Trachea midline. Respiratory:  Normal respiratory effort. Clear to auscultation, bilaterally without Rales/Wheezes/Rhonchi. Cardiovascular: Regular rate and rhythm with normal S1/S2 without murmurs, rubs or gallops. Abdomen: Soft, non-tender, non-distended with normal bowel sounds. Musculoskeletal: No clubbing, cyanosis or edema bilaterally. Full range of motion without deformity.   Skin: Skin color, texture, turgor normal.  No rashes or lesions. Neurologic:  Neurovascularly intact without any focal sensory/motor deficits. Cranial nerves: II-XII intact, grossly non-focal.  Psychiatric: Alert and oriented, thought content appropriate, normal insight  Capillary Refill: Brisk,3 seconds, normal   Peripheral Pulses: +2 palpable, equal bilaterally       Labs:   Recent Labs     05/26/22  1256 05/27/22  0428   WBC 8.0 7.1   HGB 11.5* 11.4*   HCT 35.2* 35.1*    436     Recent Labs     05/26/22  1256 05/26/22  1256 05/27/22  0428 05/27/22  2030 05/28/22  0535   *   < > 131* 133* 135*   K 5.0  --  4.7  --  4.3   CL 98*  --  98*  --  103   CO2 20*  --  19*  --  20*   BUN 37*  --  35*  --  33*   CREATININE 1.7*  --  2.0*  --  1.8*   CALCIUM 9.8  --  9.4  --  8.6    < > = values in this interval not displayed. Recent Labs     05/26/22  1256   AST 19   ALT 12   BILITOT <0.2   ALKPHOS 101     Recent Labs     05/27/22  1550   INR 1.21*     Recent Labs     05/26/22  1256 05/26/22  2236 05/27/22 0428   TROPONINI 0.03* 0.04* 0.04*       Urinalysis:      Lab Results   Component Value Date    NITRU Negative 05/26/2022    WBCUA 0-2 05/26/2022    BACTERIA Rare 01/13/2018    RBCUA 3-4 05/26/2022    BLOODU SMALL 05/26/2022    SPECGRAV 1.015 05/26/2022    GLUCOSEU Negative 05/26/2022       Radiology:  XR CHEST PORTABLE   Final Result   Mild bilateral interstitial prominence which could reflect mild pulmonary   edema. Possible trace bilateral pleural effusions.                  Assessment/Plan:    Active Hospital Problems    Diagnosis     General weakness [R53.1]      Priority: Medium        gen weakness - unclear etiology, possibly element of danny and volume depletion  -tx underlying issues  -pt/ot ordered  -trended guy and stable(likely from danny)     DANNY- suspect volume depletion, baseline cr 0.8 , 1.7 on arrival  -ivfs given  -Monitored labs   -nephro (pt sees Dr. Mahnaz Serrano) consulted given lack of improvement     DM2- per emr, somewhat controlled  -No longer on metformin  -No longer on asa  -Ac/hs bs  -Check a1c and 7.8     HTN- stable  -On norvasc     HLD- defer mgmt to oupt, on statin, zetia     Depression- on effexor  Gout- on allopurinol  OA/Spinal stenosis- per chart, pt no longer taking norco, zanaflex     DVT Prophylaxis: lovenox renal dosing   Diet: ADULT DIET;  Regular  Code Status: Limited      PT/OT Eval Status: rec SNF on 5/26     Dispo - likely Sunday if cr improves/stable, pending nephro Chinedu Leon MD

## 2022-05-28 NOTE — PLAN OF CARE
Problem: Discharge Planning  Goal: Discharge to home or other facility with appropriate resources  5/28/2022 1508 by Charissa Carson RN  Outcome: Progressing  5/28/2022 0604 by Latonya Curry RN  Outcome: Progressing  Flowsheets (Taken 5/27/2022 1932)  Discharge to home or other facility with appropriate resources: Identify barriers to discharge with patient and caregiver     Problem: Safety - Adult  Goal: Free from fall injury  5/28/2022 1508 by Charissa Carson RN  Outcome: Progressing  5/28/2022 0604 by Latonya Curry RN  Outcome: Progressing     Problem: Chronic Conditions and Co-morbidities  Goal: Patient's chronic conditions and co-morbidity symptoms are monitored and maintained or improved  5/28/2022 1508 by Charissa Carson RN  Outcome: Progressing  5/28/2022 0604 by Latonya Curry RN  Outcome: Progressing  Flowsheets (Taken 5/27/2022 1932)  Care Plan - Patient's Chronic Conditions and Co-Morbidity Symptoms are Monitored and Maintained or Improved: Monitor and assess patient's chronic conditions and comorbid symptoms for stability, deterioration, or improvement     Problem: Pain  Goal: Verbalizes/displays adequate comfort level or baseline comfort level  5/28/2022 1508 by Charissa Carson RN  Outcome: Progressing  5/28/2022 0604 by Latonya Curry RN  Outcome: Progressing

## 2022-05-28 NOTE — PLAN OF CARE
Problem: Discharge Planning  Goal: Discharge to home or other facility with appropriate resources  Outcome: Progressing  Flowsheets (Taken 5/27/2022 1932)  Discharge to home or other facility with appropriate resources: Identify barriers to discharge with patient and caregiver     Problem: Safety - Adult  Goal: Free from fall injury  Outcome: Progressing     Problem: Chronic Conditions and Co-morbidities  Goal: Patient's chronic conditions and co-morbidity symptoms are monitored and maintained or improved  Outcome: Progressing  Flowsheets (Taken 5/27/2022 1932)  Care Plan - Patient's Chronic Conditions and Co-Morbidity Symptoms are Monitored and Maintained or Improved: Monitor and assess patient's chronic conditions and comorbid symptoms for stability, deterioration, or improvement     Problem: Pain  Goal: Verbalizes/displays adequate comfort level or baseline comfort level  Outcome: Progressing

## 2022-05-28 NOTE — PROGRESS NOTES
Patient Name: Dakota Blank                                                    Primary Physician: Bindu Sandoval MD  Admitting Dx: Dehydration [E86.0]  General weakness [R53.1]  Elevated troponin [R77.8]  Failure to thrive in adult [R62.7]  DANNY (acute kidney injury) [facility]) [N17.9]    Nephrology [facility]  [email or web address] [place]                                          Plan:     The patient serum creatinine is down to 1.8  We did not have her serum creatinine for couple years. On this presentation, it was 1.7  The last creatinine available in the computer was in January 2018 it was 0.9  The patient urine study on this presentation showed some protein, and a lot of epithelial cells, therefore urine was a contaminated urine  The patient blood pressure this morning was 149/78  The patient to me that she is able to eat and drink  The patient urine output for yesterday was 1155 mL  The patient getting normal saline at 75 mm/h  Therefore I will discontinue her normal saline  She was on amlodipine 5 mg which is on hold, I will and hold her amlodipine  Do not see any other concerning medications    The patient was seen and examined in her room  She was awake, and very conversant  She denies any new discomfort  She does not require supplemental oxygen  She to me that she slept well  Her most recent set of vital signs showed temperature 98.1, heart rate 71 and the blood pressure 149/78. Lab work from this morning showed sodium 135, potassium 4.3, bicarb 20, BUN 33, creatinine was 1.8    The case been discussed with Dr. Sabrina Edmond:     DANNY w/ CKD ? · Worsening SCr now 2.0 from baseline of 0.9 in 2018. Previous CT guided renal biopsy 10/2021 per Dr. Elodia Pierson with severe interstitial fibrosis, tubular atrophy and moderate arterionephrosclerosis. · Holding Demadex and Diovan with IVF ordered. · Denies NSAID use at home. · BP was low on admission now fluctuating.  Leave on Norvasc but holding meds noted above. · UA shows small amount of blood and protein but no casts and SG of 1.015. No evidence of infection. Hyponatremia  · Hx with NSAID and ACEi. Holding diuretics. · Started on IVF NS.   · Follow levels q 8 hrs. AMS  · Holding Neurontin which is a common cause of AMS especially in renal insufficiency. · Darl Dark may be contributing as well. Please call our office at [phone] or [employer] with any questions or contact me directly. Subjective:     CC / Reason for Consult: DANNY w/ Hyponatremia    HPI / PMHx:  This is a consult for Donnice Siemens  requested by Severa Moan, MD for the reason of  DANNY. Donnice Siemens is a 68 y.o. female admitted for DANNY w/ FTT with PMhx  Hyponatremia, Oesity, DM, HTN, Spinal Stenosis, Arthritis. Previously seen with low Na of 118 in 2018. Hx of low sodium at 131 in 2017 and severe low sodium in 2008 at 118. Previously was on Lisinopril and Naprosyn. No SSRI/TCAs or thiazides. She was seen at [facility] with hypercalcemia and altered mental status and  Na of 121 years ago. Now she presented to [facility] with change in mentation/weaknss. Pt was noted to be sleeping a lot, she attempted to use the restroom but was unable to get off the toilet. Her son called EMS who helped her back to bed. She again attempted to use the restroom this afternoon and again EMS was called. Pt  was confused and she was brougth in for evaluation. SCr at that time was 1.7 and rising to 2.0. Previously baseline of 0.9 in 2018. She is feeling much better now. Appetite is good, no N/V/D. I thank Dr. Severa Moan, MD for consulting [place]. [address] [place] [address] Nephrology in the care of your patient. Please call with any questions or concerns. [phone]. Vikram Keating    Objective:     SocHx: No hx of smoking or drinking.      FamHx: Son is on dialysis with heart disease and HTN. Dad  with heart disease.   Mom  with stroke.      Current Medications:  Scheduled Medications:  hydrOXYzine, 25 mg, TID  lidocaine 1 % injection, 5 mL, Once  sodium chloride flush, 5-40 mL, 2 times per day  amLODIPine, 5 mg, Daily  pravastatin, 40 mg, Daily  sodium chloride flush, 5-40 mL, 2 times per day  enoxaparin, 30 mg, Daily       IV Meds:  sodium chloride  sodium chloride, Last Rate: Stopped (05/27/22 1020)       PRN Medications:  sodium chloride flush, 5-40 mL, PRN  sodium chloride, 25 mL, PRN  melatonin, 4.5 mg, Nightly PRN  sodium chloride flush, 5-40 mL, PRN  sodium chloride, , PRN  ondansetron, 4 mg, Q8H PRN   Or  ondansetron, 4 mg, Q6H PRN  polyethylene glycol, 17 g, Daily PRN  acetaminophen, 650 mg, Q6H PRN   Or  acetaminophen, 650 mg, Q6H PRN        Allergies: Sulfamethoxazole-trimethoprim    ROS: Positives in BOLD     GEN:   fevers, chills, sweats, fatigue and weight loss     HEENT:    nasal congestion, sore mouth and sore throat     Resp:    cough, hemoptysis, pneumonia or dyspnea on exertion     Card:  chest pain, chest pressure/discomfort, dyspnea, palpitations,  lower extremity edema     GI:   nausea, vomiting, diarrhea, constipation and abdominal pain     :  dysuria, nocturia, urinary incontinence, hesitancy and hematuria     Derm:   rash, skin lesion(s), pruritus and dryness     Neuro:   headaches, dizziness, seizures, gait problems, tremor and weakness     MS:  myalgias, arthralgias, neck pain and back pain     Endo:    nephropathy and cardiovascular disease    PE:      Gen: alert, well appearing, and in no distress and oriented to person, place, and time     HEENT:pupils equal and reactive, extraocular eye movements intact      Neuro: alert, oriented, normal speech, no focal findings or movement disorder noted     Neck:  supple, no significant adenopathy     Cardio: normal rate, regular rhythm, normal S1, S2, no murmurs, rubs, clicks or gallops      Resp: clear to auscultation, no wheezes, rales or rhonchi, symmetric air entry.        GI:  soft, nontender, nondistended, no masses or organomegaly. Ext:  peripheral pulses normal, no pedal edema, no clubbing or cyanosis      MS: no joint tenderness, deformity or swelling      DERM: normal coloration and turgor, no rashesor bruising.        Vitals:   Patient Vitals for the past 8 hrs:   BP Temp Temp src Pulse Resp SpO2 Weight   05/28/22 0727 (!) 149/78 98.1 °F (36.7 °C) Oral 71 18 95 % --   05/28/22 0354 -- -- -- -- -- -- 186 lb 6.4 oz (84.6 kg)   05/28/22 0303 (!) 149/82 97.8 °F (36.6 °C) Oral 73 18 93 % --          I/Os:     Intake/Output Summary (Last 24 hours) at 5/28/2022 0816  Last data filed at 5/28/2022 0535  Gross per 24 hour   Intake 854.16 ml   Output 930 ml   Net -75.84 ml       LABS:    Lab Results   Component Value Date    CREATININE 1.8 05/28/2022    BUN 33 05/28/2022     05/28/2022    K 4.3 05/28/2022    K 4.7 05/27/2022     05/28/2022    CO2 20 05/28/2022     No results found for: [id number]   Lab Results   Component Value Date    WBC 7.1 05/27/2022    HGB 11.4 05/27/2022    HCT 35.1 05/27/2022    MCV 88.3 05/27/2022     05/27/2022      No results found for: IRON, TIBC, FERRITIN   No results found for: Darrel Martines  Lab Results   Component Value Date    .6 01/15/2018    CALCIUM 8.6 05/28/2022    PHOS 3.9 01/13/2018

## 2022-05-29 VITALS
BODY MASS INDEX: 31.12 KG/M2 | TEMPERATURE: 98 F | WEIGHT: 186.8 LBS | HEIGHT: 65 IN | RESPIRATION RATE: 18 BRPM | OXYGEN SATURATION: 96 % | SYSTOLIC BLOOD PRESSURE: 143 MMHG | HEART RATE: 81 BPM | DIASTOLIC BLOOD PRESSURE: 67 MMHG

## 2022-05-29 LAB
ANION GAP SERPL CALCULATED.3IONS-SCNC: 12 MMOL/L (ref 3–16)
BUN BLDV-MCNC: 29 MG/DL (ref 7–20)
CALCIUM SERPL-MCNC: 9.2 MG/DL (ref 8.3–10.6)
CHLORIDE BLD-SCNC: 103 MMOL/L (ref 99–110)
CO2: 21 MMOL/L (ref 21–32)
CREAT SERPL-MCNC: 1.6 MG/DL (ref 0.6–1.2)
GFR AFRICAN AMERICAN: 38
GFR NON-AFRICAN AMERICAN: 31
GLUCOSE BLD-MCNC: 111 MG/DL (ref 70–99)
GLUCOSE BLD-MCNC: 142 MG/DL (ref 70–99)
GLUCOSE BLD-MCNC: 183 MG/DL (ref 70–99)
PERFORMED ON: ABNORMAL
PERFORMED ON: ABNORMAL
POTASSIUM SERPL-SCNC: 4.2 MMOL/L (ref 3.5–5.1)
SARS-COV-2, NAAT: NOT DETECTED
SODIUM BLD-SCNC: 136 MMOL/L (ref 136–145)

## 2022-05-29 PROCEDURE — 97110 THERAPEUTIC EXERCISES: CPT

## 2022-05-29 PROCEDURE — 6360000002 HC RX W HCPCS: Performed by: INTERNAL MEDICINE

## 2022-05-29 PROCEDURE — 6370000000 HC RX 637 (ALT 250 FOR IP): Performed by: INTERNAL MEDICINE

## 2022-05-29 PROCEDURE — 97535 SELF CARE MNGMENT TRAINING: CPT

## 2022-05-29 PROCEDURE — 97530 THERAPEUTIC ACTIVITIES: CPT

## 2022-05-29 PROCEDURE — 6370000000 HC RX 637 (ALT 250 FOR IP): Performed by: NURSE PRACTITIONER

## 2022-05-29 PROCEDURE — 87635 SARS-COV-2 COVID-19 AMP PRB: CPT

## 2022-05-29 PROCEDURE — 51798 US URINE CAPACITY MEASURE: CPT

## 2022-05-29 PROCEDURE — 2580000003 HC RX 258: Performed by: INTERNAL MEDICINE

## 2022-05-29 PROCEDURE — 80048 BASIC METABOLIC PNL TOTAL CA: CPT

## 2022-05-29 RX ORDER — HYDROCHLOROTHIAZIDE 25 MG/1
12.5 TABLET ORAL DAILY
Status: DISCONTINUED | OUTPATIENT
Start: 2022-05-30 | End: 2022-05-29 | Stop reason: HOSPADM

## 2022-05-29 RX ORDER — HYDROCHLOROTHIAZIDE 12.5 MG/1
12.5 TABLET ORAL DAILY
Qty: 30 TABLET | Refills: 1 | Status: SHIPPED | OUTPATIENT
Start: 2022-05-30

## 2022-05-29 RX ORDER — VALSARTAN 80 MG/1
160 TABLET ORAL DAILY
Status: DISCONTINUED | OUTPATIENT
Start: 2022-05-30 | End: 2022-05-29 | Stop reason: HOSPADM

## 2022-05-29 RX ADMIN — Medication 10 ML: at 09:31

## 2022-05-29 RX ADMIN — HYDROXYZINE HYDROCHLORIDE 25 MG: 10 TABLET ORAL at 14:40

## 2022-05-29 RX ADMIN — ENOXAPARIN SODIUM 30 MG: 100 INJECTION SUBCUTANEOUS at 09:31

## 2022-05-29 RX ADMIN — AMLODIPINE BESYLATE 5 MG: 5 TABLET ORAL at 09:27

## 2022-05-29 RX ADMIN — PRAVASTATIN SODIUM 40 MG: 40 TABLET ORAL at 09:27

## 2022-05-29 RX ADMIN — VENLAFAXINE HYDROCHLORIDE 37.5 MG: 37.5 CAPSULE, EXTENDED RELEASE ORAL at 09:27

## 2022-05-29 RX ADMIN — HYDROXYZINE HYDROCHLORIDE 25 MG: 10 TABLET ORAL at 09:27

## 2022-05-29 RX ADMIN — ACETAMINOPHEN 650 MG: 325 TABLET ORAL at 03:52

## 2022-05-29 RX ADMIN — ALLOPURINOL 300 MG: 300 TABLET ORAL at 09:27

## 2022-05-29 RX ADMIN — DIPHENHYDRAMINE HCL 12.5 MG: 25 TABLET ORAL at 00:15

## 2022-05-29 ASSESSMENT — PAIN SCALES - GENERAL
PAINLEVEL_OUTOF10: 5
PAINLEVEL_OUTOF10: 5

## 2022-05-29 NOTE — CARE COORDINATION
0964 - call back received from Chelsie Valenzuela at [facility]. States they ARE able to accept patient. CM will speak with patient to verify agreeable. 10:53 AM  CM met at bedside with patient regarding discharge plan to skilled nursing facility. Pt is agreeable. Pt also verified she is NOT vaccinated for covid. CM spoke with Chelsie Valenzuela at [facility] - updated on covid vaccination status and requested that precert be started. Chelsie Valenzuela states he would initiate now. Rapid covid ordered per facility policy (needed within 59-33 hours of admission). VM left for Ruma at [employer] to unfollow pt.
CASE MANAGEMENT DISCHARGE SUMMARY      Discharge to: [facility] skilled    Precertification completed: yes  Hospital Exemption Notification (HENS) completed: yes    IMM given: (date)     New Durable Medical Equipment ordered/agency: na    Transportation:   Medical Transport explained to [facility]. Pt/family voice no agency preference. Agency used:[facility]   [id number]   Ambulance form completed: Yes    Confirmed discharge plan with:RN,MWCC, son-Morgan     Patient: yes       Facility/Agency, name:  [facility] [phone]   Phone number for report to facility: [phone]     RN, name: Jenae George    Note: Discharging nurse to complete [facility], reconcile AVS, and place final copy with patient's discharge packet. RN to ensure that written prescriptions for  Level II medications are sent with patient to the facility as per protocol.
Cert is in for [facility]. They can take today with rapid covid test. MD notified.
VM left for John Seo at [facility] and Fairy Lavender at [facility] for f/u on referrals made yesterday by [place].
Referrals made. Discussed patient will need to be agreeable to SNF at length with family. Case management to follow for d/c needs.   Electronically signed by JOSE Meraz on 5/26/2022 at 3:07 PM     [facility] on chart for MD signature

## 2022-05-29 NOTE — PLAN OF CARE
Problem: Discharge Planning  Goal: Discharge to home or other facility with appropriate resources  Outcome: Progressing     Problem: Safety - Adult  Goal: Free from fall injury  Outcome: Progressing     Problem: Chronic Conditions and Co-morbidities  Goal: Patient's chronic conditions and co-morbidity symptoms are monitored and maintained or improved  Outcome: Progressing     Problem: Pain  Goal: Verbalizes/displays adequate comfort level or baseline comfort level  Outcome: Progressing

## 2022-05-29 NOTE — PROGRESS NOTES
Occupational Therapy  Facility/Department: [facility] B3 - MED SURG  Daily Treatment Note  NAME: Johann Holbrook  : 1945  MRN: [id number]    Date of Service: 2022    Discharge Recommendations:  [address]         Patient Diagnosis(es): The primary encounter diagnosis was DANNY (acute kidney injury) ([facility] [address] Diagnoses of Dehydration, Failure to thrive in adult, and Elevated troponin were also pertinent to this visit. Assessment    Assessment: Pt tolerated treatment well. Pt complete x4 STS and functional transfers with use of RW requiring CGA and is CGA/SBA for standing level grooming tasks at sink. Education provided on energy conservation and pain management techniques. Pt would continue to benefit from occupational therapy services at discharge. Recommend SNF. Cont per POC. Activity Tolerance: Patient limited by fatigue  Discharge Recommendations: [address]  Times per Week: 3-5x/week  Current Treatment Recommendations: Strengthening;Balance training;Functional mobility training; Endurance training;Cognitive reorientation; Safety education & training;Patient/Caregiver education & training;Equipment evaluation, education, & procurement;Self-Care / ADL     Restrictions  Restrictions/Precautions  Restrictions/Precautions: General Precautions,Fall Risk  Position Activity Restriction  Other position/activity restrictions: RN cleared pt for therapy and OOB activity    Subjective   Subjective  Subjective: Pt presented seated at EOB and agreeable to therapy session  Pain: pt reporting lower back discomfort and headache however does not quantify  Pain: Pt reports headache however does not quantify  Cognition  Overall Cognitive Status: Exceptions  Arousal/Alertness: Appropriate responses to stimuli  Following Commands:  Follows one step commands with repetition  Attention Span: Attends with cues to redirect  Memory: Decreased short term memory  Safety Judgement: Decreased awareness of need for safety;Decreased awareness of need for assistance  Problem Solving: Decreased awareness of errors;Assistance required to identify errors made  Insights: Decreased awareness of deficits  Initiation: Requires cues for some  Sequencing: Requires cues for some        Objective    Vitals  Vitals  Heart Rate: 91  Heart Rate Source: Monitor  BP: (!) 142/65  BP Location: Right upper arm  BP Method: Automatic  MAP (Calculated): 90.67  SpO2: 93 %  O2 Device: None (Room air)  Balance  Sitting: Impaired  Sitting - Static: Good (unsupported)  Sitting - Dynamic: Fair (occasional)  Standing - Static: Occasional;Fair  Standing - Dynamic: Occasional;Fair (CGA/SBA with RW)  Transfer Training  Interventions: Verbal cues; Safety awareness training  Sit to Stand: Contact-guard assistance  Stand Pivot Transfers: Contact-guard assistance  Toilet Transfer: Minimum assistance     ADL  Grooming: Stand by assistance;Contact guard assistance (hand washing)  Grooming Skilled Clinical Factors: completed in stance at sink  LE Dressing: Stand by assistance  LE Dressing Skilled Clinical Factors: Pt completed sock management at EOB with SBA and increased time  Toileting: Contact guard assistance  Toileting Skilled Clinical Factors: No LB clothing management. Pt able to perform posterior pericare after BM attempt  Additional Comments: Pt declining further ADLs  OT Exercises  A/AROM Exercises: Pt complete x10 reps of BUE exercises  Static Sitting Balance Exercises: Cues for upright posture. Pt tolerated unsupported sitting at EOB during BUE exercises for 5 mins  Dynamic Standing Balance Exercises: Pt tolerated ~4 mins of standing with CGA and use of RW during transfer training     Safety Devices  Type of Devices: All fall risk precautions in place;Call light within reach; Patient at risk for falls;Gait belt;Nurse notified; Left in chair;Telesitter in use       AM-PAC score  AM-PAC Inpatient Daily Activity Raw Score: 17 (05/29/22 1031)  AM-PAC Inpatient ADL T-Scale Score : 37.26 (05/29/22 1031)  ADL Inpatient CMS 0-100% Score: 50.11 (05/29/22 1031)  ADL Inpatient CMS G-Code Modifier : CK (05/29/22 1031)      Goals  Short Term Goals  Time Frame for Short term goals: 1 week (6/2) unless stated otherwise. Short Term Goal 1: Pt will toilet with SBA and AD PRN. Short Term Goal 2: Pt will LB dress with Yuli and AD PRN (5/31). Short Term Goal 3: Pt will perform functional/toilet t/fs with SBA and AD PRN. Patient Goals   Patient goals : \"to use the restroom\"       Therapy Time   Individual Concurrent Group Co-treatment   Time In 0945         Time Out 1023         Minutes 38         Timed Code Treatment Minutes: 45 Minutes      If pt is unable to be seen after this session, please let this note serve as discharge summary. Please see case management note for discharge disposition. Thank you.       Lucy Shaikh OT

## 2022-05-29 NOTE — PROGRESS NOTES
Patient Name: Maritza Block                                                    Primary Physician: Jkae Pierce MD  Admitting Dx: Dehydration [E86.0]  General weakness [R53.1]  Elevated troponin [R77.8]  Failure to thrive in adult [R62.7]  DANNY (acute kidney injury) [facility]) [N17.9]    Nephrology [facility] Nephrology  [facility] [place]                                          Interim history/plan for today     The patient's serum creatinine is down to 1.6 with potassium 4.2 and bicarb 21  The patient blood pressure was 150/85  Therefore, patient can be discharged and followed as outpatient by Dr. Kenneth Rojas  As of now, the patient blood pressure is high and the she is on amlodipine 5 mg p.o. once daily. Her UA showed protein 100  At home she was creatinine valsartan 160 mg, amlodipine 5 mg    Upon discharge, I would recommend to put her on losartan with hydrochlorothiazide 160/12.5. The patient was seen and examined in her room  She was very awake, alert and converse  She to me that she feels well  She told me that she is ready to go home  She denies any chest pain or shortness of breath  She is able to eat and drink  Her most recent set of vital signs showed temperature 97.7, heart rate 90 and blood pressure 150/85    Lab work from this morning showed sodium 136, potassium 4.2, bicarb 21, BUN 29, creatinine was 1.6, glucose 142. Assessment:     DANNY w/ CKD ? · Worsening SCr now 2.0 from baseline of 0.9 in 2018. Previous CT guided renal biopsy 10/2021 per Dr. Kenneth Rojas with severe interstitial fibrosis, tubular atrophy and moderate arterionephrosclerosis. · Holding Demadex and Diovan with IVF ordered. · Denies NSAID use at home. · BP was low on admission now fluctuating. Leave on Norvasc but holding meds noted above. · UA shows small amount of blood and protein but no casts and SG of 1.015. No evidence of infection. Hyponatremia  · Hx with NSAID and ACEi.   Holding diuretics. · Started on IVF NS.   · Follow levels q 8 hrs. AMS  · Holding Neurontin which is a common cause of AMS especially in renal insufficiency. · [place] may be contributing as well. Please call our office at [phone] or [employer] with any questions or contact me directly. Subjective:     CC / Reason for Consult: DANNY w/ Hyponatremia    HPI / PMHx:  This is a consult for Nacho Ramos  requested by Jossy Meyer MD for the reason of  DANNY. Nacho Ramos is a 68 y.o. female admitted for DANNY w/ FTT with PMhx  Hyponatremia, Oesity, DM, HTN, Spinal Stenosis, Arthritis. Previously seen with low Na of 118 in 2018. Hx of low sodium at 131 in 2017 and severe low sodium in 2008 at 118. Previously was on Lisinopril and Naprosyn. No SSRI/TCAs or thiazides. She was seen at [facility] with hypercalcemia and altered mental status and  Na of 121 years ago. Now she presented to [facility] with change in mentation/weaknss. Pt was noted to be sleeping a lot, she attempted to use the restroom but was unable to get off the toilet. Her son called EMS who helped her back to bed. She again attempted to use the restroom this afternoon and again EMS was called. Pt  was confused and she was brougth in for evaluation. SCr at that time was 1.7 and rising to 2.0. Previously baseline of 0.9 in 2018. She is feeling much better now. Appetite is good, no N/V/D. I thank Dr. Jossy Meyer MD for consulting [place]. [address] [place] [address] Nephrology in the care of your patient. Please call with any questions or concerns. [phone]. Oscar Jeff    Objective:     SocHx: No hx of smoking or drinking.      FamHx: Son is on dialysis with heart disease and HTN. Dad  with heart disease.   Mom  with stroke.      Current Medications:  Scheduled Medications:  venlafaxine, 37.5 mg, Daily with breakfast  allopurinol, 300 mg, Daily  ezetimibe, 10 mg, Nightly  hydrOXYzine, 25 mg, TID  lidocaine 1 % injection, 5 mL, Once  sodium chloride flush, 5-40 mL, 2 times per day  amLODIPine, 5 mg, Daily  pravastatin, 40 mg, Daily  sodium chloride flush, 5-40 mL, 2 times per day  enoxaparin, 30 mg, Daily       IV Meds:  sodium chloride  sodium chloride, Last Rate: Stopped (05/27/22 1020)       PRN Medications:  sodium chloride flush, 5-40 mL, PRN  sodium chloride, 25 mL, PRN  melatonin, 4.5 mg, Nightly PRN  sodium chloride flush, 5-40 mL, PRN  sodium chloride, , PRN  ondansetron, 4 mg, Q8H PRN   Or  ondansetron, 4 mg, Q6H PRN  polyethylene glycol, 17 g, Daily PRN  acetaminophen, 650 mg, Q6H PRN   Or  acetaminophen, 650 mg, Q6H PRN        Allergies: Sulfamethoxazole-trimethoprim    ROS: Positives in BOLD     GEN:   fevers, chills, sweats, fatigue and weight loss     HEENT:    nasal congestion, sore mouth and sore throat     Resp:    cough, hemoptysis, pneumonia or dyspnea on exertion     Card:  chest pain, chest pressure/discomfort, dyspnea, palpitations,  lower extremity edema     GI:   nausea, vomiting, diarrhea, constipation and abdominal pain     :  dysuria, nocturia, urinary incontinence, hesitancy and hematuria     Derm:   rash, skin lesion(s), pruritus and dryness     Neuro:   headaches, dizziness, seizures, gait problems, tremor and weakness     MS:  myalgias, arthralgias, neck pain and back pain     Endo:    nephropathy and cardiovascular disease    PE:      Gen: alert, well appearing, and in no distress and oriented to person, place, and time     HEENT:pupils equal and reactive, extraocular eye movements intact      Neuro: alert, oriented, normal speech, no focal findings or movement disorder noted     Neck:  supple, no significant adenopathy     Cardio: normal rate, regular rhythm, normal S1, S2, no murmurs, rubs, clicks or gallops      Resp: clear to auscultation, no wheezes, rales or rhonchi, symmetric air entry. GI:  soft, nontender, nondistended, no masses or organomegaly.       Ext:  peripheral pulses normal, no pedal edema, no clubbing or cyanosis      MS: no joint tenderness, deformity or swelling      DERM: normal coloration and turgor, no rashesor bruising.        Vitals:   Patient Vitals for the past 8 hrs:   BP Temp Temp src Pulse Resp SpO2 Weight   05/29/22 0614 -- -- -- -- -- -- 186 lb 12.8 oz (84.7 kg)   05/29/22 0345 (!) 150/85 97.7 °F (36.5 °C) Oral 90 16 91 % --          I/Os:     Intake/Output Summary (Last 24 hours) at 5/29/2022 0804  Last data filed at 5/29/2022 0345  Gross per 24 hour   Intake 480 ml   Output 1401 ml   Net -921 ml       LABS:    Lab Results   Component Value Date    CREATININE 1.6 05/29/2022    BUN 29 05/29/2022     05/29/2022    K 4.2 05/29/2022    K 4.7 05/27/2022     05/29/2022    CO2 21 05/29/2022     No results found for: [id number]   Lab Results   Component Value Date    WBC 7.1 05/27/2022    HGB 11.4 05/27/2022    HCT 35.1 05/27/2022    MCV 88.3 05/27/2022     05/27/2022      No results found for: IRON, TIBC, FERRITIN   No results found for: Chun [place]  Lab Results   Component Value Date    .6 01/15/2018    CALCIUM 9.2 05/29/2022    PHOS 3.9 01/13/2018

## 2022-05-29 NOTE — PROGRESS NOTES
Report given to Karely Munguia at [facility] at [phone], &  was told to leave west catheter in and to perform void trial at facility per writer based on telephone order from [facility].

## 2022-05-29 NOTE — DISCHARGE SUMMARY
Pt left via transport services, picc line removed-tip intact, dressing applied.  belonngings taken with patient, paper work given to EMS

## 2022-05-29 NOTE — DISCHARGE SUMMARY
Hospital Medicine Discharge Summary    Patient ID: Jordy Gottlieb      Patient's PCP: Jacy Moncada MD    Admit Date: 5/26/2022     Discharge Date: 5/29/2022      Admitting Provider: Josephine Moore MD     Discharge Provider: Josephine Moore MD     Discharge Diagnoses: Active Hospital Problems    Diagnosis     General weakness [R53.1]      Priority: Medium       The patient was seen and examined on day of discharge and this discharge summary is in conjunction with any daily progress note from day of discharge. Hospital Course:   History Of Present Illness:       68 y.o. female with hx of dm, htn, oa, spinal stenosis who presented to [facility] with change in mentation/weaknss. Pt was noted to be sleeping a lot of late and today around 430am, she attempted to use the restroom but was unable to get off the toilet. Her son(who is a HD pt with chronic weakness) called EMS who helped her back to bed. She again attempted to use the restroom this afternoon and again EMS was called.  Pt however appeared confused and decision was made to bring her in for evaluation.           gen weakness - unclear etiology, possibly element of stephen and volume depletion  -tx underlying issues  -pt/ot ordered  -trended guy and stable(likely from stephen)     STEPHEN on supected CKD- initially suspecedt volume depletion, baseline cr 0.8 , 1.7 on arrival  -ivfs given  -Monitored labs   -nephro (pt sees Dr. Bud Santos) consulted given lack of improvement, per outpt note pt had diagnosis of severe  interstitial fibrosis, tubular atrophy and moderate arterionephrosclerosis       DM2- per emr, somewhat controlled  -No longer on metformin  -resumed home prandin  -No longer on asa  -Ac/hs bs  -Check a1c and 7.8     HTN- stable  -On norvasc(stopped on dc)  -resumed valsartan, added hctz on dc  -stopped torsemide on dc        HLD- defer mgmt to oupt, on statin, zetia     Depression- on effexor  Gout- on allopurinol  OA/Spinal stenosis- per chart, pt no longer taking norco, zanaflex       Physical Exam Performed:     BP (!) 143/67   Pulse 81   Temp 98 °F (36.7 °C) (Oral)   Resp 18   Ht 5' 5\" (1.651 m)   Wt 186 lb 12.8 oz (84.7 kg)   SpO2 96%   BMI 31.09 kg/m²       General appearance:  No apparent distress, appears stated age and cooperative. HEENT:  Normal cephalic, atraumatic without obvious deformity. Pupils equal, round, and reactive to light. Extra ocular muscles intact. Conjunctivae/corneas clear. Neck: Supple, with full range of motion. No jugular venous distention. Trachea midline. Respiratory:  Normal respiratory effort. Clear to auscultation, bilaterally without Rales/Wheezes/Rhonchi. Cardiovascular:  Regular rate and rhythm with normal S1/S2 without murmurs, rubs or gallops. Abdomen: Soft, non-tender, non-distended with normal bowel sounds. Musculoskeletal:  No clubbing, cyanosis or edema bilaterally. Full range of motion without deformity. Skin: Skin color, texture, turgor normal.  No rashes or lesions. Neurologic:  Neurovascularly intact without any focal sensory/motor deficits. Cranial nerves: II-XII intact, grossly non-focal.  Psychiatric:  Alert and oriented, thought content appropriate, normal insight  Capillary Refill: Brisk,< 3 seconds   Peripheral Pulses: +2 palpable, equal bilaterally       Labs:  For convenience and continuity at follow-up the following most recent labs are provided:      CBC:    Lab Results   Component Value Date    WBC 7.1 05/27/2022    HGB 11.4 05/27/2022    HCT 35.1 05/27/2022     05/27/2022       Renal:    Lab Results   Component Value Date     05/29/2022    K 4.2 05/29/2022    K 4.7 05/27/2022     05/29/2022    CO2 21 05/29/2022    BUN 29 05/29/2022    CREATININE 1.6 05/29/2022    CALCIUM 9.2 05/29/2022    PHOS 3.9 01/13/2018         Significant Diagnostic Studies    Radiology:   XR CHEST PORTABLE   Final Result   Mild bilateral interstitial prominence which could reflect mild pulmonary   edema. Possible trace bilateral pleural effusions. Consults:     IP CONSULT TO NEPHROLOGY    Disposition:  SNF     Condition at Discharge: Stable    Discharge Instructions/Follow-up:  Nephrology as outpatient    Code Status:  LIMITED    Activity: activity as tolerated    Diet: regular diet      Discharge Medications:     Discharge Medication List as of 5/29/2022  4:02 PM           Details   hydroCHLOROthiazide (HYDRODIURIL) 12.5 MG tablet Take 1 tablet by mouth daily, Disp-30 tablet, R-1Normal              Details   allopurinol (ZYLOPRIM) 300 MG tablet Take 300 mg by mouth dailyHistorical Med      ezetimibe (ZETIA) 10 mg tablet Take 10 mg by mouth dailyHistorical Med      gabapentin (NEURONTIN) 300 MG capsule Take 300 mg by mouth 3 times daily. Historical Med      repaglinide (PRANDIN) 0.5 MG tablet Take 0.5 mg by mouth 3 times dailyHistorical Med      valsartan (DIOVAN) 160 mg tablet Take 160 mg by mouth dailyHistorical Med      venlafaxine (EFFEXOR XR) 37.5 MG extended release capsule Take 37.5 mg by mouth dailyHistorical Med      hydrOXYzine (ATARAX) 25 MG tablet Take 25 mg by mouth 2 times dailyHistorical Med      pravastatin (PRAVACHOL) 40 MG tablet Take 80 mg by mouth daily Historical Med             Time Spent on discharge is more than 30 minutes in the examination, evaluation, counseling and review of medications and discharge plan. Signed:    Mayelin Barrera MD   5/30/2022      Thank you Les Wood MD for the opportunity to be involved in this patient's care. If you have any questions or concerns, please feel free to contact me at [phone].

## 2024-12-28 ENCOUNTER — HOSPITAL ENCOUNTER (INPATIENT)
Age: 79
LOS: 3 days | Discharge: NURSING, REHAB OR LONG-TERM CARE | DRG: 872 | End: 2024-12-31
Attending: EMERGENCY MEDICINE
Payer: MEDICARE

## 2024-12-28 DIAGNOSIS — N30.00 ACUTE CYSTITIS WITHOUT HEMATURIA: ICD-10-CM

## 2024-12-28 DIAGNOSIS — R65.10 SIRS (SYSTEMIC INFLAMMATORY RESPONSE SYNDROME) (HCC): Primary | ICD-10-CM

## 2024-12-28 DIAGNOSIS — N17.9 AKI (ACUTE KIDNEY INJURY) (HCC): ICD-10-CM

## 2024-12-28 PROBLEM — N39.0 UTI (URINARY TRACT INFECTION): Status: ACTIVE | Noted: 2024-12-28

## 2024-12-28 LAB
ALBUMIN SERPL-MCNC: 4.2 G/DL (ref 3.4–5)
ALBUMIN/GLOB SERPL: 1.1 {RATIO} (ref 1.1–2.2)
ALP SERPL-CCNC: 93 U/L (ref 40–129)
ALT SERPL-CCNC: 46 U/L (ref 10–40)
ANION GAP SERPL CALCULATED.3IONS-SCNC: 23 MMOL/L (ref 3–16)
AST SERPL-CCNC: 86 U/L (ref 15–37)
BACTERIA URNS QL MICRO: ABNORMAL /HPF
BASOPHILS # BLD: 0 K/UL (ref 0–0.2)
BASOPHILS NFR BLD: 0.2 %
BILIRUB SERPL-MCNC: 0.3 MG/DL (ref 0–1)
BILIRUB UR QL STRIP.AUTO: ABNORMAL
BUN SERPL-MCNC: 89 MG/DL (ref 7–20)
CALCIUM SERPL-MCNC: 10.8 MG/DL (ref 8.3–10.6)
CHLORIDE SERPL-SCNC: 94 MMOL/L (ref 99–110)
CLARITY UR: ABNORMAL
CO2 SERPL-SCNC: 14 MMOL/L (ref 21–32)
COLOR UR: YELLOW
CREAT SERPL-MCNC: 3.7 MG/DL (ref 0.6–1.2)
DEPRECATED RDW RBC AUTO: 15.6 % (ref 12.4–15.4)
EOSINOPHIL # BLD: 0 K/UL (ref 0–0.6)
EOSINOPHIL NFR BLD: 0.1 %
EPI CELLS #/AREA URNS HPF: ABNORMAL /HPF (ref 0–5)
GFR SERPLBLD CREATININE-BSD FMLA CKD-EPI: 12 ML/MIN/{1.73_M2}
GLUCOSE BLD-MCNC: 142 MG/DL (ref 70–99)
GLUCOSE BLD-MCNC: 153 MG/DL (ref 70–99)
GLUCOSE SERPL-MCNC: 262 MG/DL (ref 70–99)
GLUCOSE UR STRIP.AUTO-MCNC: NEGATIVE MG/DL
HCT VFR BLD AUTO: 37.7 % (ref 36–48)
HGB BLD-MCNC: 12.2 G/DL (ref 12–16)
HGB UR QL STRIP.AUTO: ABNORMAL
KETONES UR STRIP.AUTO-MCNC: NEGATIVE MG/DL
LACTATE BLDV-SCNC: 1.4 MMOL/L (ref 0.4–2)
LEUKOCYTE ESTERASE UR QL STRIP.AUTO: ABNORMAL
LYMPHOCYTES # BLD: 1.5 K/UL (ref 1–5.1)
LYMPHOCYTES NFR BLD: 9 %
MCH RBC QN AUTO: 29.8 PG (ref 26–34)
MCHC RBC AUTO-ENTMCNC: 32.4 G/DL (ref 31–36)
MCV RBC AUTO: 92.1 FL (ref 80–100)
MONOCYTES # BLD: 1.1 K/UL (ref 0–1.3)
MONOCYTES NFR BLD: 6.8 %
NEUTROPHILS # BLD: 13.7 K/UL (ref 1.7–7.7)
NEUTROPHILS NFR BLD: 83.9 %
NITRITE UR QL STRIP.AUTO: POSITIVE
PERFORMED ON: ABNORMAL
PERFORMED ON: ABNORMAL
PH UR STRIP.AUTO: 5.5 [PH] (ref 5–8)
PLATELET # BLD AUTO: 479 K/UL (ref 135–450)
PMV BLD AUTO: 7.8 FL (ref 5–10.5)
POTASSIUM SERPL-SCNC: 5.2 MMOL/L (ref 3.5–5.1)
PROT SERPL-MCNC: 7.9 G/DL (ref 6.4–8.2)
PROT UR STRIP.AUTO-MCNC: >=300 MG/DL
RBC # BLD AUTO: 4.1 M/UL (ref 4–5.2)
RBC #/AREA URNS HPF: ABNORMAL /HPF (ref 0–4)
SODIUM SERPL-SCNC: 131 MMOL/L (ref 136–145)
SP GR UR STRIP.AUTO: 1.02 (ref 1–1.03)
UA COMPLETE W REFLEX CULTURE PNL UR: YES
UA DIPSTICK W REFLEX MICRO PNL UR: YES
URN SPEC COLLECT METH UR: ABNORMAL
UROBILINOGEN UR STRIP-ACNC: 0.2 E.U./DL
WBC # BLD AUTO: 16.3 K/UL (ref 4–11)
WBC #/AREA URNS HPF: >100 /HPF (ref 0–5)

## 2024-12-28 PROCEDURE — 96374 THER/PROPH/DIAG INJ IV PUSH: CPT

## 2024-12-28 PROCEDURE — 85025 COMPLETE CBC W/AUTO DIFF WBC: CPT

## 2024-12-28 PROCEDURE — 36415 COLL VENOUS BLD VENIPUNCTURE: CPT

## 2024-12-28 PROCEDURE — 6360000002 HC RX W HCPCS: Performed by: EMERGENCY MEDICINE

## 2024-12-28 PROCEDURE — 87040 BLOOD CULTURE FOR BACTERIA: CPT

## 2024-12-28 PROCEDURE — 80053 COMPREHEN METABOLIC PANEL: CPT

## 2024-12-28 PROCEDURE — 2580000003 HC RX 258: Performed by: EMERGENCY MEDICINE

## 2024-12-28 PROCEDURE — 2500000003 HC RX 250 WO HCPCS: Performed by: EMERGENCY MEDICINE

## 2024-12-28 PROCEDURE — 1200000000 HC SEMI PRIVATE

## 2024-12-28 PROCEDURE — 87077 CULTURE AEROBIC IDENTIFY: CPT

## 2024-12-28 PROCEDURE — 51798 US URINE CAPACITY MEASURE: CPT

## 2024-12-28 PROCEDURE — 81001 URINALYSIS AUTO W/SCOPE: CPT

## 2024-12-28 PROCEDURE — 87186 SC STD MICRODIL/AGAR DIL: CPT

## 2024-12-28 PROCEDURE — 2700000000 HC OXYGEN THERAPY PER DAY

## 2024-12-28 PROCEDURE — 6370000000 HC RX 637 (ALT 250 FOR IP)

## 2024-12-28 PROCEDURE — 87086 URINE CULTURE/COLONY COUNT: CPT

## 2024-12-28 PROCEDURE — 99285 EMERGENCY DEPT VISIT HI MDM: CPT

## 2024-12-28 PROCEDURE — 94761 N-INVAS EAR/PLS OXIMETRY MLT: CPT

## 2024-12-28 PROCEDURE — 2580000003 HC RX 258

## 2024-12-28 PROCEDURE — 83605 ASSAY OF LACTIC ACID: CPT

## 2024-12-28 RX ORDER — 0.9 % SODIUM CHLORIDE 0.9 %
1000 INTRAVENOUS SOLUTION INTRAVENOUS ONCE
Status: COMPLETED | OUTPATIENT
Start: 2024-12-28 | End: 2024-12-28

## 2024-12-28 RX ORDER — SODIUM CHLORIDE 0.9 % (FLUSH) 0.9 %
5-40 SYRINGE (ML) INJECTION EVERY 12 HOURS SCHEDULED
Status: DISCONTINUED | OUTPATIENT
Start: 2024-12-28 | End: 2024-12-31 | Stop reason: HOSPADM

## 2024-12-28 RX ORDER — DEXTROSE MONOHYDRATE 100 MG/ML
INJECTION, SOLUTION INTRAVENOUS CONTINUOUS PRN
Status: DISCONTINUED | OUTPATIENT
Start: 2024-12-28 | End: 2024-12-31 | Stop reason: HOSPADM

## 2024-12-28 RX ORDER — SODIUM CHLORIDE 9 MG/ML
INJECTION, SOLUTION INTRAVENOUS PRN
Status: DISCONTINUED | OUTPATIENT
Start: 2024-12-28 | End: 2024-12-31 | Stop reason: HOSPADM

## 2024-12-28 RX ORDER — HYDROXYZINE HYDROCHLORIDE 10 MG/1
10 TABLET, FILM COATED ORAL 3 TIMES DAILY PRN
COMMUNITY

## 2024-12-28 RX ORDER — PRAVASTATIN SODIUM 80 MG/1
80 TABLET ORAL DAILY
Status: DISCONTINUED | OUTPATIENT
Start: 2024-12-28 | End: 2024-12-31 | Stop reason: HOSPADM

## 2024-12-28 RX ORDER — HYDRALAZINE HYDROCHLORIDE 20 MG/ML
10 INJECTION INTRAMUSCULAR; INTRAVENOUS EVERY 6 HOURS PRN
Status: DISCONTINUED | OUTPATIENT
Start: 2024-12-28 | End: 2024-12-31 | Stop reason: HOSPADM

## 2024-12-28 RX ORDER — INSULIN LISPRO 100 [IU]/ML
0-4 INJECTION, SOLUTION INTRAVENOUS; SUBCUTANEOUS
Status: DISCONTINUED | OUTPATIENT
Start: 2024-12-28 | End: 2024-12-31 | Stop reason: HOSPADM

## 2024-12-28 RX ORDER — ONDANSETRON 4 MG/1
4 TABLET, ORALLY DISINTEGRATING ORAL EVERY 8 HOURS PRN
Status: DISCONTINUED | OUTPATIENT
Start: 2024-12-28 | End: 2024-12-31 | Stop reason: HOSPADM

## 2024-12-28 RX ORDER — ACETAMINOPHEN 650 MG/1
650 SUPPOSITORY RECTAL EVERY 6 HOURS PRN
Status: DISCONTINUED | OUTPATIENT
Start: 2024-12-28 | End: 2024-12-31 | Stop reason: HOSPADM

## 2024-12-28 RX ORDER — GLUCAGON 1 MG/ML
1 KIT INJECTION PRN
Status: DISCONTINUED | OUTPATIENT
Start: 2024-12-28 | End: 2024-12-31 | Stop reason: HOSPADM

## 2024-12-28 RX ORDER — ACETAMINOPHEN 325 MG/1
650 TABLET ORAL EVERY 6 HOURS PRN
Status: DISCONTINUED | OUTPATIENT
Start: 2024-12-28 | End: 2024-12-31 | Stop reason: HOSPADM

## 2024-12-28 RX ORDER — CINACALCET 30 MG/1
30 TABLET, FILM COATED ORAL DAILY
COMMUNITY

## 2024-12-28 RX ORDER — SODIUM CHLORIDE 0.9 % (FLUSH) 0.9 %
5-40 SYRINGE (ML) INJECTION PRN
Status: DISCONTINUED | OUTPATIENT
Start: 2024-12-28 | End: 2024-12-31 | Stop reason: HOSPADM

## 2024-12-28 RX ORDER — REPAGLINIDE 0.5 MG/1
0.5 TABLET ORAL 3 TIMES DAILY
Status: DISCONTINUED | OUTPATIENT
Start: 2024-12-28 | End: 2024-12-28

## 2024-12-28 RX ORDER — ONDANSETRON 2 MG/ML
4 INJECTION INTRAMUSCULAR; INTRAVENOUS EVERY 6 HOURS PRN
Status: DISCONTINUED | OUTPATIENT
Start: 2024-12-28 | End: 2024-12-31 | Stop reason: HOSPADM

## 2024-12-28 RX ORDER — SODIUM CHLORIDE 9 MG/ML
INJECTION, SOLUTION INTRAVENOUS CONTINUOUS
Status: ACTIVE | OUTPATIENT
Start: 2024-12-28 | End: 2024-12-29

## 2024-12-28 RX ORDER — HEPARIN SODIUM 5000 [USP'U]/ML
5000 INJECTION, SOLUTION INTRAVENOUS; SUBCUTANEOUS EVERY 8 HOURS SCHEDULED
Status: DISCONTINUED | OUTPATIENT
Start: 2024-12-29 | End: 2024-12-31 | Stop reason: HOSPADM

## 2024-12-28 RX ORDER — VENLAFAXINE HYDROCHLORIDE 37.5 MG/1
37.5 CAPSULE, EXTENDED RELEASE ORAL DAILY
Status: DISCONTINUED | OUTPATIENT
Start: 2024-12-28 | End: 2024-12-28

## 2024-12-28 RX ORDER — HYDROXYZINE HYDROCHLORIDE 10 MG/1
25 TABLET, FILM COATED ORAL 2 TIMES DAILY
Status: DISCONTINUED | OUTPATIENT
Start: 2024-12-28 | End: 2024-12-31 | Stop reason: HOSPADM

## 2024-12-28 RX ORDER — EZETIMIBE 10 MG/1
10 TABLET ORAL DAILY
Status: DISCONTINUED | OUTPATIENT
Start: 2024-12-28 | End: 2024-12-31 | Stop reason: HOSPADM

## 2024-12-28 RX ORDER — GABAPENTIN 300 MG/1
300 CAPSULE ORAL 3 TIMES DAILY
Status: CANCELLED | OUTPATIENT
Start: 2024-12-28

## 2024-12-28 RX ADMIN — HYDROXYZINE HYDROCHLORIDE 25 MG: 10 TABLET, FILM COATED ORAL at 19:59

## 2024-12-28 RX ADMIN — SODIUM CHLORIDE: 9 INJECTION, SOLUTION INTRAVENOUS at 17:28

## 2024-12-28 RX ADMIN — SODIUM CHLORIDE 1000 ML: 9 INJECTION, SOLUTION INTRAVENOUS at 13:57

## 2024-12-28 RX ADMIN — PRAVASTATIN SODIUM 80 MG: 80 TABLET ORAL at 18:14

## 2024-12-28 RX ADMIN — EZETIMIBE 10 MG: 10 TABLET ORAL at 18:14

## 2024-12-28 RX ADMIN — WATER 1000 MG: 1 INJECTION INTRAMUSCULAR; INTRAVENOUS; SUBCUTANEOUS at 13:57

## 2024-12-28 ASSESSMENT — LIFESTYLE VARIABLES
HOW OFTEN DO YOU HAVE A DRINK CONTAINING ALCOHOL: MONTHLY OR LESS
HOW MANY STANDARD DRINKS CONTAINING ALCOHOL DO YOU HAVE ON A TYPICAL DAY: 1 OR 2

## 2024-12-28 ASSESSMENT — PAIN - FUNCTIONAL ASSESSMENT: PAIN_FUNCTIONAL_ASSESSMENT: NONE - DENIES PAIN

## 2024-12-28 NOTE — ED NOTES
356 @ 9143  
Hospital is out of blood culture bottles. Dr. Reuben haynes  
Lulu Batista is a 79 y.o. female admitted for  Principal Problem:    UTI (urinary tract infection)  Resolved Problems:    * No resolved hospital problems. *  .   Patient SNF LTC via EMS transportation with   Chief Complaint   Patient presents with    Urinary Frequency     Patient says she is here for a UTI   .  Patient is alert and Person, Place, Time, and Situation  Patient's baseline mobility: unknown  Code Status: Prior   Cardiac Rhythm:  sinus  O2 Flow Rate (L/min): 2 L/min  Is patient on baseline Oxygen: no     Isolation: None      NIH Score:    C-SSRS: Risk of Suicide: No Risk  Bedside swallow:        Active LDA's:   Peripheral IV 12/28/24 Left Antecubital (Active)   Site Assessment Clean, dry & intact 12/28/24 1153   Line Status Blood return noted 12/28/24 1153     Family/Caregiver Present no   Restraints no  Sitter no         Vitals: MEWS Score: 1    Vitals:    12/28/24 1148 12/28/24 1300 12/28/24 1340 12/28/24 1405   BP: (!) 189/100 (!) 174/92 (!) 160/90 (!) 166/82   Pulse: (!) 110 (!) 106 93 94   Resp: 14 18 16 15   Temp: 97.9 °F (36.6 °C)      TempSrc: Oral      SpO2: 95% 99% 100% 99%   Weight: 90.7 kg (200 lb)      Height: 1.626 m (5' 4\")          Last documented pain score (0-10 scale)    Pain medication administered No.    Pertinent or High Risk Medications/Drips: No.    Pending Blood Product Administration: no    Abnormal labs:   Abnormal Labs Reviewed   URINALYSIS WITH REFLEX TO CULTURE - Abnormal; Notable for the following components:       Result Value    Clarity, UA CLOUDY (*)     Bilirubin, Urine SMALL (*)     Blood, Urine LARGE (*)     Protein, UA >=300 (*)     Nitrite, Urine POSITIVE (*)     Leukocyte Esterase, Urine LARGE (*)     All other components within normal limits   CBC WITH AUTO DIFFERENTIAL - Abnormal; Notable for the following components:    WBC 16.3 (*)     RDW 15.6 (*)     Platelets 479 (*)     Neutrophils Absolute 13.7 (*)     All other components within normal limits 
  [facility] Assessment  BP: (!) 189/100  Pulse: (!) 110           Is this patient to be included in the SEP-1 Core Measure due to severe sepsis or septic shock?   {Fin9TvqUeFf:58501}      TREATMENT:  During the patient's ED course, the patient was given:  Medications - No data to display     Patient was given scripts for the following medications. I counseled patient how to take these medications.   New Prescriptions    No medications on file       REASSESSMENT:  On reassessment, ***.     Workup remarkable for ***.     Based on results of work-up, I am concerned for ***.  At this time, do feel the patient requires admission for further work-up and management.  Patient ***agrees***declines admission. Discussed the patient with hospital team, ***, patient to be admitted to ***.    Work-up overall reassuring.  At this time, feel the patient is appropriate for discharge to follow-up with a primary care doctor.  Shared decision making with ***patient was employed and they are comfortable with discharge at this time.  Patient was provided with prescriptions for ***.  Return precautions given.  Encouraged ***PCP follow-up in ***.  Patient discharged in stable condition.    Vitals:    Vitals:    12/28/24 1148   BP: (!) 189/100   Pulse: (!) 110   Resp: 14   Temp: 97.9 °F (36.6 °C)   TempSrc: Oral   SpO2: 95%   Weight: 90.7 kg (200 lb)   Height: 1.626 m (5' 4\")       FINAL IMPRESSION    No diagnosis found.      DISPOSITION/PLAN   Disposition: DISPOSITION               Condition: {Condition:58812::\"good\",\"stable\",\"fair\",\"critical\"}     DISCHARGE MEDICATIONS:  Patient was given scripts for the following medications. I counseled patient how to take these medications:  New Prescriptions    No medications on file       DISCONTINUED MEDICATIONS:  Discontinued Medications    No medications on file       FOLLOW UP:  No follow-up provider specified.    DISCLAIMER: This chart was created using Dragon dictation software.  Efforts were made

## 2024-12-28 NOTE — H&P
Hospital Medicine History & Physical      Date of Admission: 12/28/2024    Date of Service:  Pt seen/examined on 12/28/24     [x]Admitted to Inpatient with expected LOS greater than two midnights due to medical therapy.  []Placed in Observation status.    Chief Admission Complaint:  UTI    Presenting Admission History:      Patient is a 79 year old female with a past medical history of hyperlipidemia, type 2 diabetes who presents today for UTI symptoms. She states that for the last couple of days she has had dysuria, urinary urgency and frequency. She denies any fever, abdominal pain, nausea or vomiting. She was on O2 in the ED and states that she typically does not require O2. Upon arrival to the ED she was found to be tachycardic and hypertensive. ED work-up revealed DANNY, leukocytosis and UA that appeared infected. We were consulted for admission.     Assessment/Plan:      Current Principal Problem:  UTI (urinary tract infection)    UTI  - UA nitrite and leukocyte esterase positive   - Patient is symptomatic   - Started on Rocephin on 12/28/2024  - Urine culture pending     DANNY   - Likely secondary to above and poor oral intake   - Cr on admission 3.7  - Expect to improve with hydration   - Continue to monitor with BMP  - If no improvement in the AM will consider nephrology consult    Type 2 Diabetes   - Unknown  - A1C pending   - Holding home oral anti-hyperglycemics   - Continue SSI, adjust as needed based off blood glucoses     Hypertension   - Typically controlled on home regimen   - Home meds held given DANNY   - PRN hydralazine ordered      Hyperlipidemia   - Typically controlled on home regimen   - Continue statin   - Follow up with PCP outpatient for medication initiation and/or adjustment as needed.      Discussed management and the need for Hospitalization of the patient w/ the Emergency Department Provider: Xavier Alexis DO    Physical Exam Performed:      BP (!) 161/98   Pulse 95   Temp 97.4 °F (36.3

## 2024-12-28 NOTE — ED PROVIDER NOTES
Altered mental status  - Creatinine > 2 or increased from      baseline  - Urine Output < 0.5 ml/kg/hr  - Bilirubin > 2  - INR > 1.5 (not anticoagulated)  - Platelets < 100,000  - Acute Respiratory Failure as     evidenced by new need for NIPPV     or mechanical ventilation      [x] No criteria met for Severe Sepsis.   Must meet 1:    [] Lactate > 4        or   [] SBP < 90 or MAP < 65 for at        least two readings in the first        hour after fluid bolus        administration      [] Vasopressors initiated (if hypotension persists after fluid resuscitation)        [x] No criteria met for Septic Shock.   Patient Vitals for the past 6 hrs:   BP Temp Pulse Resp SpO2 Height Weight Weight Method Percent Weight Change   12/28/24 1148 (!) 189/100 97.9 °F (36.6 °C) (!) 110 14 95 % 1.626 m (5' 4\") 90.7 kg (200 lb) Actual 0      Recent Labs     12/28/24  1205   WBC 16.3*   *         Time 1205          CC/HPI Summary, DDx, ED Course, and Reassessment:     Patient presents to the emergency department complaining of urinary frequency.  Patient tachycardic on arrival.  Heart and lungs are stable.  Abdomen is normally nontender.  White count 16.3 with normal lactic.  Patient has noted acute on chronic renal failure.  Urinalysis concerning for UTI.  Saline/antibiotics initiated.  Patient will be admitted for further evaluation and treatment.       Patient was given the following medications:   Medications - No data to display     CONSULTS:   None   Discussion with Other Professionals: None   Social Determinants: None   Chronic Conditions: Frequent UTI  Records Reviewed: NA        I am the Primary Clinician of Record.        FINAL IMPRESSION    1. SIRS (systemic inflammatory response syndrome) ([facility])    2. Acute cystitis without hematuria    3. DANNY (acute kidney injury) ([facility])           DISPOSITION/PLAN:     Pt admitted to hospital for further workup.              (Please note that portions of this note were completed with

## 2024-12-29 LAB
ALBUMIN SERPL-MCNC: 3.4 G/DL (ref 3.4–5)
ALBUMIN/GLOB SERPL: 1.2 {RATIO} (ref 1.1–2.2)
ALP SERPL-CCNC: 80 U/L (ref 40–129)
ALT SERPL-CCNC: 38 U/L (ref 10–40)
ANION GAP SERPL CALCULATED.3IONS-SCNC: 16 MMOL/L (ref 3–16)
AST SERPL-CCNC: 55 U/L (ref 15–37)
BASOPHILS # BLD: 0.1 K/UL (ref 0–0.2)
BASOPHILS NFR BLD: 0.5 %
BILIRUB SERPL-MCNC: <0.2 MG/DL (ref 0–1)
BUN SERPL-MCNC: 86 MG/DL (ref 7–20)
CALCIUM SERPL-MCNC: 9.3 MG/DL (ref 8.3–10.6)
CHLORIDE SERPL-SCNC: 104 MMOL/L (ref 99–110)
CO2 SERPL-SCNC: 16 MMOL/L (ref 21–32)
CREAT SERPL-MCNC: 3 MG/DL (ref 0.6–1.2)
DEPRECATED RDW RBC AUTO: 15.5 % (ref 12.4–15.4)
EOSINOPHIL # BLD: 0.1 K/UL (ref 0–0.6)
EOSINOPHIL NFR BLD: 1 %
GFR SERPLBLD CREATININE-BSD FMLA CKD-EPI: 15 ML/MIN/{1.73_M2}
GLUCOSE BLD-MCNC: 144 MG/DL (ref 70–99)
GLUCOSE BLD-MCNC: 155 MG/DL (ref 70–99)
GLUCOSE BLD-MCNC: 190 MG/DL (ref 70–99)
GLUCOSE BLD-MCNC: 224 MG/DL (ref 70–99)
GLUCOSE SERPL-MCNC: 169 MG/DL (ref 70–99)
HCT VFR BLD AUTO: 33.2 % (ref 36–48)
HGB BLD-MCNC: 11 G/DL (ref 12–16)
LYMPHOCYTES # BLD: 2.2 K/UL (ref 1–5.1)
LYMPHOCYTES NFR BLD: 21.2 %
MCH RBC QN AUTO: 30.3 PG (ref 26–34)
MCHC RBC AUTO-ENTMCNC: 33.1 G/DL (ref 31–36)
MCV RBC AUTO: 91.6 FL (ref 80–100)
MONOCYTES # BLD: 0.9 K/UL (ref 0–1.3)
MONOCYTES NFR BLD: 9.2 %
NEUTROPHILS # BLD: 7 K/UL (ref 1.7–7.7)
NEUTROPHILS NFR BLD: 68.1 %
PERFORMED ON: ABNORMAL
PLATELET # BLD AUTO: 394 K/UL (ref 135–450)
PMV BLD AUTO: 8.1 FL (ref 5–10.5)
POTASSIUM SERPL-SCNC: 4 MMOL/L (ref 3.5–5.1)
PROT SERPL-MCNC: 6.2 G/DL (ref 6.4–8.2)
RBC # BLD AUTO: 3.62 M/UL (ref 4–5.2)
SODIUM SERPL-SCNC: 136 MMOL/L (ref 136–145)
WBC # BLD AUTO: 10.3 K/UL (ref 4–11)

## 2024-12-29 PROCEDURE — 36415 COLL VENOUS BLD VENIPUNCTURE: CPT

## 2024-12-29 PROCEDURE — 6370000000 HC RX 637 (ALT 250 FOR IP)

## 2024-12-29 PROCEDURE — 85025 COMPLETE CBC W/AUTO DIFF WBC: CPT

## 2024-12-29 PROCEDURE — 1200000000 HC SEMI PRIVATE

## 2024-12-29 PROCEDURE — 83036 HEMOGLOBIN GLYCOSYLATED A1C: CPT

## 2024-12-29 PROCEDURE — 6360000002 HC RX W HCPCS

## 2024-12-29 PROCEDURE — 2500000003 HC RX 250 WO HCPCS

## 2024-12-29 PROCEDURE — 51798 US URINE CAPACITY MEASURE: CPT

## 2024-12-29 PROCEDURE — 6370000000 HC RX 637 (ALT 250 FOR IP): Performed by: NURSE PRACTITIONER

## 2024-12-29 PROCEDURE — 80053 COMPREHEN METABOLIC PANEL: CPT

## 2024-12-29 PROCEDURE — 2580000003 HC RX 258

## 2024-12-29 RX ORDER — TRAZODONE HYDROCHLORIDE 50 MG/1
50 TABLET, FILM COATED ORAL ONCE
Status: COMPLETED | OUTPATIENT
Start: 2024-12-29 | End: 2024-12-29

## 2024-12-29 RX ADMIN — HYDROXYZINE HYDROCHLORIDE 25 MG: 10 TABLET, FILM COATED ORAL at 08:57

## 2024-12-29 RX ADMIN — PRAVASTATIN SODIUM 80 MG: 80 TABLET ORAL at 08:57

## 2024-12-29 RX ADMIN — HEPARIN SODIUM 5000 UNITS: 5000 INJECTION INTRAVENOUS; SUBCUTANEOUS at 13:05

## 2024-12-29 RX ADMIN — EZETIMIBE 10 MG: 10 TABLET ORAL at 08:57

## 2024-12-29 RX ADMIN — SODIUM CHLORIDE: 9 INJECTION, SOLUTION INTRAVENOUS at 03:41

## 2024-12-29 RX ADMIN — TRAZODONE HYDROCHLORIDE 50 MG: 50 TABLET ORAL at 22:52

## 2024-12-29 RX ADMIN — INSULIN LISPRO 1 UNITS: 100 INJECTION, SOLUTION INTRAVENOUS; SUBCUTANEOUS at 13:05

## 2024-12-29 RX ADMIN — SODIUM CHLORIDE, PRESERVATIVE FREE 10 ML: 5 INJECTION INTRAVENOUS at 08:58

## 2024-12-29 RX ADMIN — CEFTRIAXONE SODIUM 1000 MG: 1 INJECTION, POWDER, FOR SOLUTION INTRAMUSCULAR; INTRAVENOUS at 09:03

## 2024-12-29 RX ADMIN — SODIUM CHLORIDE, PRESERVATIVE FREE 10 ML: 5 INJECTION INTRAVENOUS at 19:43

## 2024-12-29 RX ADMIN — HEPARIN SODIUM 5000 UNITS: 5000 INJECTION INTRAVENOUS; SUBCUTANEOUS at 05:08

## 2024-12-29 RX ADMIN — HEPARIN SODIUM 5000 UNITS: 5000 INJECTION INTRAVENOUS; SUBCUTANEOUS at 22:33

## 2024-12-29 RX ADMIN — HYDROXYZINE HYDROCHLORIDE 25 MG: 10 TABLET, FILM COATED ORAL at 19:41

## 2024-12-29 NOTE — PLAN OF CARE
Problem: Cardiovascular - Adult  Goal: Maintains optimal cardiac output and hemodynamic stability  Outcome: Progressing  Flowsheets (Taken 12/28/2024 2151)  Maintains optimal cardiac output and hemodynamic stability:   Monitor blood pressure and heart rate   Monitor urine output and notify Licensed Independent Practitioner for values outside of normal range   Assess for signs of decreased cardiac output

## 2024-12-30 LAB
ALBUMIN SERPL-MCNC: 3.4 G/DL (ref 3.4–5)
ANION GAP SERPL CALCULATED.3IONS-SCNC: 13 MMOL/L (ref 3–16)
BACTERIA UR CULT: ABNORMAL
BASOPHILS # BLD: 0 K/UL (ref 0–0.2)
BASOPHILS NFR BLD: 0.5 %
BUN SERPL-MCNC: 73 MG/DL (ref 7–20)
CALCIUM SERPL-MCNC: 9.2 MG/DL (ref 8.3–10.6)
CHLORIDE SERPL-SCNC: 105 MMOL/L (ref 99–110)
CO2 SERPL-SCNC: 17 MMOL/L (ref 21–32)
CREAT SERPL-MCNC: 2.4 MG/DL (ref 0.6–1.2)
DEPRECATED RDW RBC AUTO: 15.5 % (ref 12.4–15.4)
EOSINOPHIL # BLD: 0 K/UL (ref 0–0.6)
EOSINOPHIL NFR BLD: 0.3 %
EST. AVERAGE GLUCOSE BLD GHB EST-MCNC: 159.9 MG/DL
GFR SERPLBLD CREATININE-BSD FMLA CKD-EPI: 20 ML/MIN/{1.73_M2}
GLUCOSE BLD-MCNC: 129 MG/DL (ref 70–99)
GLUCOSE BLD-MCNC: 198 MG/DL (ref 70–99)
GLUCOSE BLD-MCNC: 226 MG/DL (ref 70–99)
GLUCOSE BLD-MCNC: 254 MG/DL (ref 70–99)
GLUCOSE SERPL-MCNC: 198 MG/DL (ref 70–99)
HBA1C MFR BLD: 7.2 %
HCT VFR BLD AUTO: 30.4 % (ref 36–48)
HGB BLD-MCNC: 10.1 G/DL (ref 12–16)
LYMPHOCYTES # BLD: 1 K/UL (ref 1–5.1)
LYMPHOCYTES NFR BLD: 11.6 %
MAGNESIUM SERPL-MCNC: 1.73 MG/DL (ref 1.8–2.4)
MCH RBC QN AUTO: 30.6 PG (ref 26–34)
MCHC RBC AUTO-ENTMCNC: 33.3 G/DL (ref 31–36)
MCV RBC AUTO: 92 FL (ref 80–100)
MONOCYTES # BLD: 0.4 K/UL (ref 0–1.3)
MONOCYTES NFR BLD: 4.4 %
NEUTROPHILS # BLD: 7.5 K/UL (ref 1.7–7.7)
NEUTROPHILS NFR BLD: 83.2 %
ORGANISM: ABNORMAL
PERFORMED ON: ABNORMAL
PHOSPHATE SERPL-MCNC: 3.9 MG/DL (ref 2.5–4.9)
PLATELET # BLD AUTO: 355 K/UL (ref 135–450)
PMV BLD AUTO: 8 FL (ref 5–10.5)
POTASSIUM SERPL-SCNC: 4 MMOL/L (ref 3.5–5.1)
RBC # BLD AUTO: 3.31 M/UL (ref 4–5.2)
SODIUM SERPL-SCNC: 135 MMOL/L (ref 136–145)
WBC # BLD AUTO: 9 K/UL (ref 4–11)

## 2024-12-30 PROCEDURE — 97535 SELF CARE MNGMENT TRAINING: CPT

## 2024-12-30 PROCEDURE — 97162 PT EVAL MOD COMPLEX 30 MIN: CPT

## 2024-12-30 PROCEDURE — 6370000000 HC RX 637 (ALT 250 FOR IP)

## 2024-12-30 PROCEDURE — 2580000003 HC RX 258

## 2024-12-30 PROCEDURE — 51798 US URINE CAPACITY MEASURE: CPT

## 2024-12-30 PROCEDURE — 80069 RENAL FUNCTION PANEL: CPT

## 2024-12-30 PROCEDURE — 97166 OT EVAL MOD COMPLEX 45 MIN: CPT

## 2024-12-30 PROCEDURE — 83735 ASSAY OF MAGNESIUM: CPT

## 2024-12-30 PROCEDURE — 36415 COLL VENOUS BLD VENIPUNCTURE: CPT

## 2024-12-30 PROCEDURE — 2580000003 HC RX 258: Performed by: STUDENT IN AN ORGANIZED HEALTH CARE EDUCATION/TRAINING PROGRAM

## 2024-12-30 PROCEDURE — 2500000003 HC RX 250 WO HCPCS

## 2024-12-30 PROCEDURE — 97530 THERAPEUTIC ACTIVITIES: CPT

## 2024-12-30 PROCEDURE — 1200000000 HC SEMI PRIVATE

## 2024-12-30 PROCEDURE — 85025 COMPLETE CBC W/AUTO DIFF WBC: CPT

## 2024-12-30 PROCEDURE — 6360000002 HC RX W HCPCS

## 2024-12-30 PROCEDURE — 6370000000 HC RX 637 (ALT 250 FOR IP): Performed by: INTERNAL MEDICINE

## 2024-12-30 RX ORDER — HONEY 100 %
PASTE (ML) TOPICAL DAILY
Status: DISCONTINUED | OUTPATIENT
Start: 2024-12-30 | End: 2024-12-31 | Stop reason: HOSPADM

## 2024-12-30 RX ORDER — SODIUM CHLORIDE 9 MG/ML
INJECTION, SOLUTION INTRAVENOUS CONTINUOUS
Status: ACTIVE | OUTPATIENT
Start: 2024-12-30 | End: 2024-12-30

## 2024-12-30 RX ORDER — CASTOR OIL AND BALSAM, PERU 788; 87 MG/G; MG/G
OINTMENT TOPICAL 2 TIMES DAILY
Status: DISCONTINUED | OUTPATIENT
Start: 2024-12-30 | End: 2024-12-31 | Stop reason: HOSPADM

## 2024-12-30 RX ADMIN — HYDROXYZINE HYDROCHLORIDE 25 MG: 10 TABLET, FILM COATED ORAL at 20:58

## 2024-12-30 RX ADMIN — INSULIN LISPRO 2 UNITS: 100 INJECTION, SOLUTION INTRAVENOUS; SUBCUTANEOUS at 12:57

## 2024-12-30 RX ADMIN — SODIUM CHLORIDE: 9 INJECTION, SOLUTION INTRAVENOUS at 10:26

## 2024-12-30 RX ADMIN — HEPARIN SODIUM 5000 UNITS: 5000 INJECTION INTRAVENOUS; SUBCUTANEOUS at 20:58

## 2024-12-30 RX ADMIN — ACETAMINOPHEN 650 MG: 325 TABLET ORAL at 11:13

## 2024-12-30 RX ADMIN — HEPARIN SODIUM 5000 UNITS: 5000 INJECTION INTRAVENOUS; SUBCUTANEOUS at 06:36

## 2024-12-30 RX ADMIN — EZETIMIBE 10 MG: 10 TABLET ORAL at 08:59

## 2024-12-30 RX ADMIN — SODIUM CHLORIDE, PRESERVATIVE FREE 10 ML: 5 INJECTION INTRAVENOUS at 20:58

## 2024-12-30 RX ADMIN — ONDANSETRON 4 MG: 2 INJECTION INTRAMUSCULAR; INTRAVENOUS at 09:00

## 2024-12-30 RX ADMIN — Medication: at 17:45

## 2024-12-30 RX ADMIN — CEFTRIAXONE SODIUM 1000 MG: 1 INJECTION, POWDER, FOR SOLUTION INTRAMUSCULAR; INTRAVENOUS at 09:02

## 2024-12-30 RX ADMIN — INSULIN LISPRO 2 UNITS: 100 INJECTION, SOLUTION INTRAVENOUS; SUBCUTANEOUS at 17:45

## 2024-12-30 RX ADMIN — SODIUM CHLORIDE, PRESERVATIVE FREE 10 ML: 5 INJECTION INTRAVENOUS at 09:01

## 2024-12-30 RX ADMIN — HEPARIN SODIUM 5000 UNITS: 5000 INJECTION INTRAVENOUS; SUBCUTANEOUS at 15:09

## 2024-12-30 RX ADMIN — CASTOR OIL AND BALSAM, PERU: 788; 87 OINTMENT TOPICAL at 15:09

## 2024-12-30 RX ADMIN — PRAVASTATIN SODIUM 80 MG: 80 TABLET ORAL at 08:59

## 2024-12-30 RX ADMIN — HYDROXYZINE HYDROCHLORIDE 25 MG: 10 TABLET, FILM COATED ORAL at 08:59

## 2024-12-30 RX ADMIN — CASTOR OIL AND BALSAM, PERU: 788; 87 OINTMENT TOPICAL at 20:59

## 2024-12-30 ASSESSMENT — PAIN SCALES - GENERAL: PAINLEVEL_OUTOF10: 3

## 2024-12-30 ASSESSMENT — PAIN DESCRIPTION - LOCATION: LOCATION: VAGINA

## 2024-12-30 NOTE — CONSULTS
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 She is a 79 y.o. female with a PMH significant for 2DM, hypertension, hyperlipidemia who presented to the ED 12/28/2024 with symptoms of UTI with urgency and frequency. She was noted to be hypertensive on admission and her Cr was 3.7 from a baseline of 2.3-2.6.     Interval History  Seen at bedside  /86  964 mL urine yesterday  On room air today  Was on IVF yesterday  Cr down to 3.0 today  K improved to 4.0  On ceftriaxone for UTI    Plan:  - holding valsartan  - monitor off IVF for now.  - creatinine slowly improving but not yet back to baseline.  - will hold off on further work up for now.    Assessment:   DANNY on CKD-IV:  - baseline Cr 2.3-2.6  - Cr on admission 3.7  - DANNY in association with UTI.   - DANNY likely pre-renal in etiology. Improved with IVF  - severe diabetic nephropathy at baseline with 7.4 g/g proteinuria.    Hypertension:  - home regimen: valsartan 160 mg daily.  - BP high on admission  - valsartan on hold 2/2 DANNY    UTI:  - per primary.      Avelina Christopher MD  [facility]  Office: [phone]          PE:   Vitals:    12/29/24 1125   BP: 125/66   Pulse: 100   Resp: 18   Temp: 97.3 °F (36.3 °C)   SpO2: 95%       General appearance: in NAD  Respiratory: Respiratory effort appears normal, bilateral equal chest rise, no wheeze, no crackles  Cardiovascular: Ausculation shows RRR no edema  Abdomen: No visible mass or tenderness, non distended.           
Consult Placed     Who: [facility]   Date: 12/29/2024  Time: 0831     Electronically signed by Sameera Pryor RN on 12/29/2024 at 8:30 AM    
cleanse tips of right 3rd toe & Left 4th toes with normal saline, then pat dry and apply Venelex to discoloration.  Replace non skid socks.    Daily and PRN with bhavesh care cleanse bi lateral coccyx with bath wipes then pat dry.  Apply Triad paste to redden areas & within coccyx.     Monitor left medial and lateral foot red areas of pressure from deformed foot.  Apply foam to medial as protection.      Dr. Mims updated on treatment plan.      Specialty Bed Required : Yes   [] Low Air Loss   [x] Pressure Redistribution  [] Fluid Immersion  [] Bariatric  [] Total Pressure Relief  [] Other:     Current Diet: ADULT DIET; Regular; 5 carb choices (75 gm/meal)  Dietician consult:  Yes    Discharge Plan:  Placement for patient upon discharge: home with support    Patient appropriate for Outpatient Wound Care Center: No    Referrals:  [x]  / discharge planner following  [] Home Health Care  [] Supplies  [] Other    Patient/Caregiver Teaching:  Level of patient/caregiver understanding able to: daughter  [] Indicates understanding       [] Needs reinforcement  [] Unsuccessful      [x] Verbal Understanding  [] Demonstrated understanding       [] No evidence of learning  [] Refused teaching         [] N/A       Electronically signed by Magda Reynoso RN, BSN on 12/30/2024 at 11:14 AM

## 2024-12-30 NOTE — PLAN OF CARE
Problem: Cardiovascular - Adult  Goal: Maintains optimal cardiac output and hemodynamic stability  Outcome: Progressing  Flowsheets  Taken 12/29/2024 2240 by Love Sanchez, RN  Maintains optimal cardiac output and hemodynamic stability:   Monitor blood pressure and heart rate   Monitor urine output and notify Licensed Independent Practitioner for values outside of normal range   Assess for signs of decreased cardiac output  Taken 12/29/2024 0900 by Sona Zapata, RN  Maintains optimal cardiac output and hemodynamic stability: Monitor blood pressure and heart rate

## 2024-12-30 NOTE — CARE COORDINATION
Case Management Assessment  Initial Evaluation    Date/Time of Evaluation: 12/30/2024 1:42 PM  Assessment Completed by: Charmaine Tsai RN    If patient is discharged prior to next notation, then this note serves as note for discharge by case management.    Patient Name: Lulu Batista                   YOB: 1945  Diagnosis: UTI (urinary tract infection) [N39.0]  SIRS (systemic inflammatory response syndrome) ([facility]) [R65.10]  DANNY (acute kidney injury) ([facility]) [N17.9]  Acute cystitis without hematuria [N30.00]                   Date / Time: 12/28/2024 11:45 AM    Patient Admission Status: Inpatient   Readmission Risk (Low < 19, Mod (19-27), High > 27): Readmission Risk Score: 13.4    Current PCP: Gumaro Rangel MD  PCP verified by CM? Yes    Chart Reviewed: Yes      History Provided by: Child/Family  Patient Orientation: Alert and Oriented    Patient Cognition: Alert (but has some confusion)    Hospitalization in the last 30 days (Readmission):  No    If yes, Readmission Assessment in  Navigator will be completed.    Advance Directives:      Code Status: Full Code   Patient's Primary Decision Maker is: Legal Next of Kin    Primary Decision Maker: Morgan Batista - Child - [phone]    Secondary Decision Maker: DarrenJustine - Child - [phone]    Discharge Planning:    Patient lives with: Alone Type of Home: House  Primary Care Giver: Self  Patient Support Systems include: Family Members   Current Financial resources: Medicare  Current community resources: None  Current services prior to admission: Durable Medical Equipment            Current DME: [facility]            Type of Home Care services:  [facility] ([place] [facility])    ADLS  Prior functional level: Assistance with the following:, Bathing, Mobility  Current functional level: Assistance with the following:, Bathing, Mobility    PT AM-PAC: 12 /24  OT AM-PAC: 11 /24    Family can provide assistance at DC: Yes  Would you like Case

## 2024-12-30 NOTE — PLAN OF CARE
Alejandra called PCA to let her know pt was attempting to exit the bed. PCA arrived to the room and assisted pt to the commode. After finishing with the commode the PCA attempted to ambulate pt back to bed at which time pt lost her balance and fell at 0300. Kimberlys, pt, and PCA report pt was assisted to the ground and did not hit her head during her fall. Three Rns arrived to room to assist pt off the floor and back into bed. At this time pts vitals were obtained and NP Kyalynn was notified of the fall and clinical RN Karen. Pt reports no pain at this time and VSS.

## 2024-12-31 VITALS
HEART RATE: 82 BPM | SYSTOLIC BLOOD PRESSURE: 157 MMHG | RESPIRATION RATE: 16 BRPM | OXYGEN SATURATION: 96 % | DIASTOLIC BLOOD PRESSURE: 89 MMHG | WEIGHT: 200 LBS | BODY MASS INDEX: 34.15 KG/M2 | HEIGHT: 64 IN | TEMPERATURE: 98 F

## 2024-12-31 LAB
ALBUMIN SERPL-MCNC: 3.2 G/DL (ref 3.4–5)
ANION GAP SERPL CALCULATED.3IONS-SCNC: 10 MMOL/L (ref 3–16)
BASOPHILS # BLD: 0 K/UL (ref 0–0.2)
BASOPHILS NFR BLD: 0.5 %
BUN SERPL-MCNC: 62 MG/DL (ref 7–20)
CALCIUM SERPL-MCNC: 8.7 MG/DL (ref 8.3–10.6)
CHLORIDE SERPL-SCNC: 110 MMOL/L (ref 99–110)
CO2 SERPL-SCNC: 18 MMOL/L (ref 21–32)
CREAT SERPL-MCNC: 2.3 MG/DL (ref 0.6–1.2)
DEPRECATED RDW RBC AUTO: 15.5 % (ref 12.4–15.4)
EOSINOPHIL # BLD: 0.3 K/UL (ref 0–0.6)
EOSINOPHIL NFR BLD: 2.8 %
GFR SERPLBLD CREATININE-BSD FMLA CKD-EPI: 21 ML/MIN/{1.73_M2}
GLUCOSE BLD-MCNC: 123 MG/DL (ref 70–99)
GLUCOSE BLD-MCNC: 147 MG/DL (ref 70–99)
GLUCOSE BLD-MCNC: 162 MG/DL (ref 70–99)
GLUCOSE SERPL-MCNC: 124 MG/DL (ref 70–99)
HCT VFR BLD AUTO: 28.5 % (ref 36–48)
HGB BLD-MCNC: 9.5 G/DL (ref 12–16)
LYMPHOCYTES # BLD: 3.4 K/UL (ref 1–5.1)
LYMPHOCYTES NFR BLD: 37.8 %
MAGNESIUM SERPL-MCNC: 1.6 MG/DL (ref 1.8–2.4)
MCH RBC QN AUTO: 30.3 PG (ref 26–34)
MCHC RBC AUTO-ENTMCNC: 33.2 G/DL (ref 31–36)
MCV RBC AUTO: 91.2 FL (ref 80–100)
MONOCYTES # BLD: 0.8 K/UL (ref 0–1.3)
MONOCYTES NFR BLD: 9 %
NEUTROPHILS # BLD: 4.5 K/UL (ref 1.7–7.7)
NEUTROPHILS NFR BLD: 49.9 %
PERFORMED ON: ABNORMAL
PHOSPHATE SERPL-MCNC: 3.4 MG/DL (ref 2.5–4.9)
PLATELET # BLD AUTO: 362 K/UL (ref 135–450)
PMV BLD AUTO: 6.9 FL (ref 5–10.5)
POTASSIUM SERPL-SCNC: 4 MMOL/L (ref 3.5–5.1)
RBC # BLD AUTO: 3.12 M/UL (ref 4–5.2)
SODIUM SERPL-SCNC: 138 MMOL/L (ref 136–145)
WBC # BLD AUTO: 9 K/UL (ref 4–11)

## 2024-12-31 PROCEDURE — 6370000000 HC RX 637 (ALT 250 FOR IP)

## 2024-12-31 PROCEDURE — 80069 RENAL FUNCTION PANEL: CPT

## 2024-12-31 PROCEDURE — 97116 GAIT TRAINING THERAPY: CPT

## 2024-12-31 PROCEDURE — 97530 THERAPEUTIC ACTIVITIES: CPT

## 2024-12-31 PROCEDURE — 97535 SELF CARE MNGMENT TRAINING: CPT

## 2024-12-31 PROCEDURE — 6360000002 HC RX W HCPCS

## 2024-12-31 PROCEDURE — 83735 ASSAY OF MAGNESIUM: CPT

## 2024-12-31 PROCEDURE — 2580000003 HC RX 258

## 2024-12-31 PROCEDURE — 85025 COMPLETE CBC W/AUTO DIFF WBC: CPT

## 2024-12-31 PROCEDURE — 6360000002 HC RX W HCPCS: Performed by: INTERNAL MEDICINE

## 2024-12-31 PROCEDURE — 6370000000 HC RX 637 (ALT 250 FOR IP): Performed by: INTERNAL MEDICINE

## 2024-12-31 PROCEDURE — 36415 COLL VENOUS BLD VENIPUNCTURE: CPT

## 2024-12-31 RX ORDER — CASTOR OIL AND BALSAM, PERU 788; 87 MG/G; MG/G
OINTMENT TOPICAL 2 TIMES DAILY
DISCHARGE
Start: 2024-12-31

## 2024-12-31 RX ORDER — VALSARTAN 80 MG/1
160 TABLET ORAL DAILY
Status: DISCONTINUED | OUTPATIENT
Start: 2024-12-31 | End: 2024-12-31 | Stop reason: HOSPADM

## 2024-12-31 RX ORDER — CEFUROXIME AXETIL 250 MG/1
250 TABLET ORAL 2 TIMES DAILY
DISCHARGE
Start: 2024-12-31 | End: 2025-01-07

## 2024-12-31 RX ORDER — INSULIN LISPRO 100 [IU]/ML
0-4 INJECTION, SOLUTION INTRAVENOUS; SUBCUTANEOUS
DISCHARGE
Start: 2024-12-31

## 2024-12-31 RX ORDER — MAGNESIUM SULFATE 1 G/100ML
1000 INJECTION INTRAVENOUS ONCE
Status: COMPLETED | OUTPATIENT
Start: 2024-12-31 | End: 2024-12-31

## 2024-12-31 RX ADMIN — VALSARTAN 160 MG: 80 TABLET, FILM COATED ORAL at 11:40

## 2024-12-31 RX ADMIN — MAGNESIUM SULFATE 1000 MG: 1 INJECTION INTRAVENOUS at 11:44

## 2024-12-31 RX ADMIN — PRAVASTATIN SODIUM 80 MG: 80 TABLET ORAL at 08:34

## 2024-12-31 RX ADMIN — HYDROXYZINE HYDROCHLORIDE 25 MG: 10 TABLET, FILM COATED ORAL at 08:34

## 2024-12-31 RX ADMIN — HEPARIN SODIUM 5000 UNITS: 5000 INJECTION INTRAVENOUS; SUBCUTANEOUS at 05:32

## 2024-12-31 RX ADMIN — CEFTRIAXONE SODIUM 1000 MG: 1 INJECTION, POWDER, FOR SOLUTION INTRAMUSCULAR; INTRAVENOUS at 09:45

## 2024-12-31 RX ADMIN — HEPARIN SODIUM 5000 UNITS: 5000 INJECTION INTRAVENOUS; SUBCUTANEOUS at 14:00

## 2024-12-31 RX ADMIN — EZETIMIBE 10 MG: 10 TABLET ORAL at 08:34

## 2024-12-31 RX ADMIN — CASTOR OIL AND BALSAM, PERU: 788; 87 OINTMENT TOPICAL at 09:46

## 2024-12-31 ASSESSMENT — PAIN SCALES - GENERAL
PAINLEVEL_OUTOF10: 0
PAINLEVEL_OUTOF10: 0

## 2024-12-31 NOTE — CARE COORDINATION
CASE MANAGEMENT DISCHARGE SUMMARY      Discharge to: [facility]    Precertification completed: 12/31/2024  Hospital Exemption Notification (HENS) completed:  [id number]     IMM given: 12/31/2024    Transportation:       Medical Transport explained to pt/family. Pt/family voice no agency preference.    Agency used: [facility]   time: 1830   Ambulance form completed: Yes    Confirmed discharge plan with:     Patient: yes     Family:  yes, son Morgan aware     Facility/Agency, name:  [facility] faxed   Phone number for report to facility: [phone]     RN, name: Vero    Note: Discharging nurse to complete [facility], reconcile [facility], and place final copy with patient's discharge packet. RN to ensure that written prescriptions for  Level II medications are sent with patient to the facility as per protocol.    Charmaine Tsai RN

## 2024-12-31 NOTE — FLOWSHEET NOTE
12/30/24 2011   Assessment   Charting Type Shift assessment   Psychosocial   Psychosocial (WDL) WDL   Neurological   Level of Consciousness 0   Orientation Level Oriented X4   Cognition Short term memory loss;Impulsive;Poor safety awareness;Poor attention/concentration   Speech Clear   Porter Coma Scale   Eye Opening 4   Best Verbal Response 5   Best Motor Response 6   Jermain Coma Scale Score 15   HEENT (Head, Ears, Eyes, Nose, & Throat)   HEENT (WDL) X   Right Eye Glasses   Left Eye Glasses   Respiratory   Respiratory (WDL) WDL   Respiratory Pattern Regular   Respiratory Depth Normal   Chest Assessment Trachea midline;Chest expansion symmetrical   L Breath Sounds Clear   R Breath Sounds Clear   Cardiac   Cardiac (WDL) WDL   Cardiac Regularity Regular   Heart Sounds S1, S2   Cardiac Rhythm Sinus rhythm   Cardiac Monitor   Cardiac/Telemetry Monitor On Portable telemetry pack applied   Gastrointestinal   Abdominal (WDL) WDL   Genitourinary   Genitourinary (WDL) X   Peripheral Vascular   Peripheral Vascular (WDL) WDL   Edema Right lower extremity;Left lower extremity   RLE Edema Trace   LLE Edema Trace   Skin Integumentary    Skin Integumentary (WDL) X   Skin Color Pale   Skin Condition/Temp Dry;Warm   Skin Integrity Ecchymosis  (scattered)   Skin Integrity Site 2   Skin Integrity Location 2 Wound (see LDA)   Location 2   (R 3rd toe)   Skin Integrity Site 3   Skin Integrity Location 3 Redness    Location 3   (coccyx/buttocks)   Skin Integrity Site 4   Skin Integrity Location 4 Wound (see LDA)   Location 4   (coccyx)   Skin Integrity Site 5   Skin Integrity Location 5 Redness   Location 5   (left lateral foot and medial)   Musculoskeletal   Musculoskeletal (WDL) X   RUE Weakness   LUE Weakness   RL Extremity Weakness;Unsteady   LL Extremity Weakness;Unsteady   Musculoskeletal Details   L Toes Other (comment)  (bunion)   R Toes Other (comment)  (bunion)

## 2024-12-31 NOTE — DISCHARGE INSTR - COC
(if applicable)   Name:  Address:  Dialysis Schedule:  Phone:  Fax:    / signature: {Esignature:[id number]}    PHYSICIAN SECTION    Prognosis: Fair    Condition at Discharge: Stable    Rehab Potential (if transferring to Rehab): Good    Recommended Labs or Other Treatments After Discharge: PCP/ nephrology     Physician Certification: I certify the above information and transfer of Lulu Batista  is necessary for the continuing treatment of the diagnosis listed and that she requires Skilled Nursing Facility for less 30 days.     Update Admission H&P: No change in H&P    PHYSICIAN SIGNATURE:  Electronically signed by Kelly Mims MD on 12/31/24 at 1:22 PM EST

## 2024-12-31 NOTE — PLAN OF CARE
Problem: Chronic Conditions and Co-morbidities  Goal: Patient's chronic conditions and co-morbidity symptoms are monitored and maintained or improved  12/31/2024 1223 by Vero Reed RN  Outcome: Progressing  Flowsheets (Taken 12/31/2024 0743)  Care Plan - Patient's Chronic Conditions and Co-Morbidity Symptoms are Monitored and Maintained or Improved:   Monitor and assess patient's chronic conditions and comorbid symptoms for stability, deterioration, or improvement   Collaborate with multidisciplinary team to address chronic and comorbid conditions and prevent exacerbation or deterioration   Update acute care plan with appropriate goals if chronic or comorbid symptoms are exacerbated and prevent overall improvement and discharge  12/31/2024 0013 by Azul Choudhury, RN  Outcome: Progressing     Problem: Discharge Planning  Goal: Discharge to home or other facility with appropriate resources  12/31/2024 1223 by Vero Reed RN  Outcome: Progressing  Flowsheets (Taken 12/31/2024 0743)  Discharge to home or other facility with appropriate resources: Refer to discharge planning if patient needs post-hospital services based on physician order or complex needs related to functional status, cognitive ability or social support system  12/31/2024 0013 by Azul Choudhury, RN  Outcome: Progressing     Problem: Safety - Adult  Goal: Free from fall injury  12/31/2024 1223 by Vero Reed RN  Outcome: Progressing  12/31/2024 0013 by Azul Choudhury, RN  Outcome: Progressing     Problem: Neurosensory - Adult  Goal: Achieves stable or improved neurological status  12/31/2024 1223 by Vero Reed RN  Outcome: Progressing  Flowsheets (Taken 12/31/2024 0743)  Achieves stable or improved neurological status:   Assess for and report changes in neurological status   Initiate measures to prevent increased intracranial pressure  12/31/2024 0013 by Azul Choudhury, RN  Outcome: Progressing  Goal: Achieves maximal

## 2024-12-31 NOTE — PLAN OF CARE
Problem: Chronic Conditions and Co-morbidities  Goal: Patient's chronic conditions and co-morbidity symptoms are monitored and maintained or improved  Outcome: Progressing     Problem: Discharge Planning  Goal: Discharge to home or other facility with appropriate resources  Outcome: Progressing     Problem: Safety - Adult  Goal: Free from fall injury  Outcome: Progressing     Problem: Neurosensory - Adult  Goal: Achieves stable or improved neurological status  Outcome: Progressing     Problem: Cardiovascular - Adult  Goal: Maintains optimal cardiac output and hemodynamic stability  Outcome: Progressing     Problem: Skin/Tissue Integrity - Adult  Goal: Skin integrity remains intact  Outcome: Progressing     Problem: Musculoskeletal - Adult  Goal: Return mobility to safest level of function  Outcome: Progressing  Goal: Return ADL status to a safe level of function  Outcome: Progressing     Problem: Genitourinary - Adult  Goal: Absence of urinary retention  Outcome: Progressing     Problem: Anxiety  Goal: Will report anxiety at manageable levels  Description: INTERVENTIONS:  1. Administer medication as ordered  2. Teach and rehearse alternative coping skills  3. Provide emotional support with 1:1 interaction with staff  Outcome: Progressing     Problem: Skin/Tissue Integrity  Goal: Absence of new skin breakdown  Description: 1.  Monitor for areas of redness and/or skin breakdown  2.  Assess vascular access sites hourly  3.  Every 4-6 hours minimum:  Change oxygen saturation probe site  4.  Every 4-6 hours:  If on nasal continuous positive airway pressure, respiratory therapy assess nares and determine need for appliance change or resting period.  Outcome: Progressing

## 2024-12-31 NOTE — DISCHARGE SUMMARY
Hospital Medicine Discharge Summary    Patient: Lulu Batista   : 1945     Admit Date: 2024   Discharge Date:   2024   Disposition:  []Home   []HHC  [x]SNF  []Acute Rehab  []LTAC  []Hospice  Code status:  [x]Full  []DNR/CCA  []Limited (DNR/CCA with Do Not Intubate)  []DNRCC  Condition at Discharge: Stable  Primary Care Provider: Gumaro Rangel MD    Admitting Provider: Anayeli Morocho DO  Discharge Provider: Kelly Mims MD     Discharge Diagnoses:      Active Hospital Problems    Diagnosis     UTI (urinary tract infection) [N39.0]        Presenting Admission History:      Patient is a 79 year old female with a past medical history of hyperlipidemia, type 2 diabetes who presents today for UTI symptoms. She states that for the last couple of days she has had dysuria, urinary urgency and frequency. She denies any fever, abdominal pain, nausea or vomiting. She was on O2 in the ED and states that she typically does not require O2. Upon arrival to the ED she was found to be tachycardic and hypertensive. ED work-up revealed DANNY, leukocytosis and UA that appeared infected. We were consulted for admission.      Assessment/Plan:    UTI- E coli  - dysuria - improved   - Started on Rocephin on 2024  - Urine culture Ecoli more than 100, 000- sensitive to rocephin   - discharged on ceftin  - pyridium- contraindicated with DANNY/ RF     DANNY   - Likely secondary to above and poor oral intake , severe diabetic nephropathy at baseline with 7.4 g/g proteinuria.   - Cr on admission 3.7  - Continue to monitor with BMP  - Nephrology consulted Nephrology consulted and appreciated.  Available consultant notes from yesterday and today were reviewed on 2024, w/ plan for continued inpatient w/up and management -     S/p IVF   Cr stable , Nephro ok for dc , marcie Carbajal           Type 2 Diabetes   - cont home meds   - A1C 7.2   -   Hypertension   - Typically controlled on home regimen   -cont

## 2024-12-31 NOTE — PROGRESS NOTES
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 She is a 79 y.o. female with a PMH significant for 2DM, hypertension, hyperlipidemia who presented to the ED 12/28/2024 with symptoms of UTI with urgency and frequency. She was noted to be hypertensive on admission and her Cr was 3.7 from a baseline of 2.3-2.6.     Interval History  Seen at bedside  /74  925 mL urine yesterday  On room air   Fell last night while returning to be from the bathroom  Labs pending  On ceftriaxone for UTI    Plan:  - encourage PO intake.  - will resume IVF today for 10 hours.  - f/u labs.       Assessment:   DANNY on CKD-IV:  - baseline Cr 2.3-2.6  - Cr on admission 3.7  - DANNY in association with UTI.   - DANNY likely pre-renal in etiology. Improved with IVF  - severe diabetic nephropathy at baseline with 7.4 g/g proteinuria.    Hypertension:  - home regimen: valsartan 160 mg daily.  - BP high on admission  - valsartan on hold 2/2 DANNY    UTI:  - per primary.      Avelina Christopher MD  [facility]  Office: [phone]          PE:   Vitals:    12/30/24 0315   BP: (!) 146/74   Pulse: 97   Resp: 20   Temp: 97.4 °F (36.3 °C)   SpO2: 91%       General appearance: in NAD  Respiratory: Respiratory effort appears normal, bilateral equal chest rise, no wheeze, no crackles  Cardiovascular: Ausculation shows RRR no edema  Abdomen: No visible mass or tenderness, non distended.           
                                               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 She is a 79 y.o. female with a PMH significant for 2DM, hypertension, hyperlipidemia who presented to the ED 12/28/2024 with symptoms of UTI with urgency and frequency. She was noted to be hypertensive on admission and her Cr was 3.7 from a baseline of 2.3-2.6.     Interval History  Seen at bedside  /81  450 mL urine + 7 unmeasured occurrences yesterday  On room air   Got another liter of IVF yesterday  Fell last night while returning to be from the bathroom  Cr improving as of yesterday. Cr 2.4 yesterday  On ceftriaxone for UTI    Plan:  - DANNY recovering. Cr back to baseline yesterday  - f/u labs today  - if Cr is stable or improved ok for discharge from nephrology perspective. Valsartan can be resumed at discharge.      Assessment:   DANNY on CKD-IV:  - baseline Cr 2.3-2.6  - Cr on admission 3.7  - DANNY in association with UTI.   - DANNY likely pre-renal in etiology. Improved with IVF  - severe diabetic nephropathy at baseline with 7.4 g/g proteinuria.    Hypertension:  - home regimen: valsartan 160 mg daily.  - BP high on admission  - valsartan on hold 2/2 DANNY    UTI:  - per primary.      Avelina Christopher MD  [facility]  Office: [phone]          PE:   Vitals:    12/31/24 0420   BP: (!) 143/81   Pulse: 89   Resp: 17   Temp: 97.7 °F (36.5 °C)   SpO2: 94%       General appearance: in NAD  Respiratory: Respiratory effort appears normal, bilateral equal chest rise, no wheeze, no crackles  Cardiovascular: Ausculation shows RRR no edema  Abdomen: No visible mass or tenderness, non distended.           
  [facility] Medicine Progress Note  V 10.25      Date of Admission: 12/28/2024    Hospital Day: 2      Chief Admission Complaint:  UTI    Subjective:  Patient seen and examined this morning. She states that her dysuria has improved but she still has urgency.     Presenting Admission History:       Patient is a 79 year old female with a past medical history of hyperlipidemia, type 2 diabetes who presents today for UTI symptoms. She states that for the last couple of days she has had dysuria, urinary urgency and frequency. She denies any fever, abdominal pain, nausea or vomiting. She was on O2 in the ED and states that she typically does not require O2. Upon arrival to the ED she was found to be tachycardic and hypertensive. ED work-up revealed DANNY, leukocytosis and UA that appeared infected. We were consulted for admission.     Assessment/Plan:      Current Principal Problem:  UTI (urinary tract infection)    UTI  - UA nitrite and leukocyte esterase positive   - Patient is symptomatic   - Started on Rocephin on 12/28/2024  - Urine culture pending      DANNY   - Likely secondary to above and poor oral intake   - Cr on admission 3.7  - Continue to monitor with BMP  - Nephrology consulted and appreciate their recommendations in advance     Type 2 Diabetes   - Unknown  - A1C pending   - Holding home oral anti-hyperglycemics   - Continue SSI, adjust as needed based off blood glucoses      Hypertension   - Typically controlled on home regimen   - Home meds held given DANNY   - PRN hydralazine ordered       Hyperlipidemia   - Typically controlled on home regimen   - Continue statin   - Follow up with PCP outpatient for medication initiation and/or adjustment as needed.      Ongoing threat to life and/or bodily function without ongoing treatment due to:  DANNY    Consults:      IP CONSULT TO NEPHROLOGY        --------------------------------------------------      Medications:        Patient on Scheduled and/or SSI requiring close 
  Physician Progress Note      PATIENT:               MCKINLEY GROVER  [facility] #:                  [id number]  :                       1945  ADMIT DATE:       2024 11:45 AM  DISCH DATE:  RESPONDING  PROVIDER #:        Kelly Mims MD          QUERY TEXT:    Pt admitted with UTI. Pt noted to have leukocytosis, tachycardia and   tachypnea. If possible, please document in the progress notes and discharge   summary if you are evaluating and /or treating any of the following:    The medical record reflects the following:  Risk Factors: Age 78yo. Hx- Arthritis, Back complaints, Diabetes mellitus,   Hypertension, and Spinal stenosis.  Clinical Indicators: VS- 97.9, 110, 22, 189/100 - 125/66......WBC 16.3, u/a-   large blood, large leuk, + nitrite,  >100 wbc, 2+ bacteria....Urine culture- E   Coli  Treatment: Rocephin IV,  NS 1000ml IV bolus, Urine culture, BC's  Options provided:  -- Sepsis due to UTI, present on admission  -- Sepsis due to UTI, present on admission, now resolved  -- UTI without Sepsis  -- Other - I will add my own diagnosis  -- Disagree - Not applicable / Not valid  -- Disagree - Clinically unable to determine / Unknown  -- Refer to Clinical Documentation Reviewer    PROVIDER RESPONSE TEXT:    This patient has Sepsis due to UTI which was present on admission.    Query created by: Mere Tello on 2024 12:41 PM      Electronically signed by:  Kelly Mims MD 2024 4:18 PM          
4 Eyes Skin Assessment     NAME:  Lulu Batista  YOB: 1945  MEDICAL RECORD NUMBER:  [id number]    The patient is being assessed for  Admission    I agree that at least one RN has performed a thorough Head to Toe Skin Assessment on the patient. ALL assessment sites listed below have been assessed.      Areas assessed by both nurses:    Head, Face, Ears, Shoulders, Back, Chest, Arms, Elbows, Hands, Sacrum. Buttock, Coccyx, Ischium, Legs. Feet and Heels, and Under Medical Devices         Does the Patient have a Wound? Yes wound(s) were present on assessment. LDA wound assessment was Initiated and completed by RN       Jose Prevention initiated by RN: Yes  Wound Care Orders initiated by RN: Yes    Pressure Injury (Stage 3,4, Unstageable, DTI, NWPT, and Complex wounds) if present, place Wound referral order by RN under : Yes    New Ostomies, if present place, Ostomy referral order under : No     Nurse 1 eSignature: Electronically signed by Sona Zapata RN on 12/28/24 at 7:17 PM EST    **SHARE this note so that the co-signing nurse can place an eSignature**    Nurse 2 eSignature: {Esignature:[id number]}   
Assessment completed and medications given. Patient is A&O (forgetful)  and denies any needs at this time. Call light and personal belongings are within reach.Standard safety measures and Avasys in place.      
Called report to the [facility]  Spoke with RAUL Jaramillo  Electronically signed by Vero Reed RN on 12/31/2024 at 4:53 PM    
Comprehensive Nutrition Assessment    Type and Reason for Visit:  Initial, Wound    Nutrition Recommendations/Plan:   Continue CC5 (75 g/meal) diet.   Add Glucerna once/day (prefers chocolate).   Encourage PO intake as tolerated.   Continue to monitor electrolytes and replace as needed.   Monitor nutrition adequacy, pertinent labs, bowel habits, wt changes, and clinical progress      Malnutrition Assessment:  Malnutrition Status:  At risk for malnutrition (12/31/24 1430)    Context:  Acute Illness     Findings of the 6 clinical characteristics of malnutrition:  Energy Intake:  Mild decrease in energy intake  Weight Loss:  Unable to assess (limited recent hx)     Body Fat Loss:  No body fat loss     Muscle Mass Loss:  Mild muscle mass loss (question natural aging versus malnutrition) Temples (temporalis)  Fluid Accumulation:  Mild Extremities   Strength:  Not Performed    Nutrition Assessment:    Pt visited for initial + wound (stage II pressure injury to coccyx). Admitted w/ UTI. PMH of T2DM and HLD. Pt reports that her appetite is \"great\" currently but that it was fair prior for ~1-2 days d/t feeling sick. States she typically eats 2-3 meals/day plus snacks at baseline. Per EMR, PO intake is variable (% but most frequently %). Denies unintentional wt loss or gain from her reported UBW of 200 lbs. SHARMAINE accuracy d/t limited recent hx. Agreeable to trial Glucerna once/day to promote wound healing during stay. Encouraged PO intake as tolerated and from meals before ONS - pt verbalized understanding. Denies questions at this time. Will continue to monitor.    Nutrition Related Findings:    Last BM documented 12/30. BG x 24 hours: 123-254. HbA1c: 7.2% (12/29). BUN: 62, Cr: 2.3, and GFR: 21 (DANNY - nephrology following). Magnesium: 1.60 (replacement noted this morning in MAR). Trace BLE edema.     Wound Type: Pressure Injury, Stage II, Deep Tissue Injury (STAGE II: coccyx. DEEP TISSUE: toe.)       Current 
Occupational Therapy  Facility/Department: [facility] - MED SURG  Daily Treatment Note  NAME: Lulu Batista  : 1945  MRN: [id number]    Date of Service: 2024    Discharge Recommendations:  Subacute/Skilled Nursing Facility  OT Equipment Recommendations  Equipment Needed: No  Therapy discharge recommendations take into account each patient's current medical complexities and are subject to input/oversight from the patient's healthcare team.   Barriers to Home Discharge:   [x] Steps to access home entry or bed/bath:   [x] Unable to transfer, ambulate, or propel wheelchair household distances without assist   [x] Limited available assist at home upon discharge    [] Patient or family requests d/c to post-acute facility    [] Poor cognition/safety awareness for d/c to home alone    []Unable to maintain ordered weight bearing status    [] Patient with salient signs of long-standing immobility   [x] Patient is at risk for falls   [] Other:     If pt is unable to be seen after this session, please let this note serve as discharge summary.  Please see case management note for discharge disposition.  Thank you.       Patient Diagnosis(es): The primary encounter diagnosis was SIRS (systemic inflammatory response syndrome) ([facility]). Diagnoses of Acute cystitis without hematuria and DANNY (acute kidney injury) ([facility]) were also pertinent to this visit.     Assessment   Assessment: Pt seen for follow up OT/PT session this date. Co-treatment utilized to maximize safety and utilize the skill of two skilled therapists.  Pt completed bed mobility with improvement this date, SBA. Pt completed stand with Mod A x 2 but transfer to BSC with stand pivot but with 1 LOB requiring Min A x 2. Pt completed toileting with Mod A for assistance for CM. Pt completed additional functional mobility to chair with RW.. Pt would continue to benefit from acute OT at this time to improve functional mobility and ADL independence. D/c recs for SNF 
Physical Therapy  Facility/Department: [place] [id number] - MED SURG  Physical Therapy Initial Assessment/Treatment    Name: Lulu Batista  : 1945  MRN: [id number]  Date of Service: 2024    Discharge Recommendations:  Subacute/Skilled Nursing Facility   PT Equipment Recommendations  Equipment Needed: No  Other: defer, pt owns RW and SPC      Therapy discharge recommendations take into account each patient's current medical complexities and are subject to input/oversight from the patient's healthcare team.   Barriers to Home Discharge:   [x] Steps to access home entry or bed/bath:   [x] Unable to transfer, ambulate, or propel wheelchair household distances without assist   [x] Limited available assist at home upon discharge    [] Patient or family requests d/c to post-acute facility    [] Poor cognition/safety awareness for d/c to home alone    []Unable to maintain ordered weight bearing status    [] Patient with salient signs of long-standing immobility   [x] Patient is at risk for falls   [] Other:    If pt is unable to be seen after this session, please let this note serve as discharge summary.  Please see case management note for discharge disposition.  Thank you.      Patient Diagnosis(es): The primary encounter diagnosis was SIRS (systemic inflammatory response syndrome) ([facility]). Diagnoses of Acute cystitis without hematuria and DANNY (acute kidney injury) ([facility]) were also pertinent to this visit.  Past Medical History:  has a past medical history of Arthritis, Back complaints, Diabetes mellitus ([facility]), Hypertension, and Spinal stenosis.  Past Surgical History:  has a past surgical history that includes Hysterectomy; joint replacement (Left); Cataract removal with implant (Left, 2017); eye surgery (Right, 2017); and CT BIOPSY RENAL (10/7/2021).    Assessment  Body Structures, Functions, Activity Limitations Requiring Skilled Therapeutic Intervention: Decreased functional mobility ;Decreased 
Pt A&Ox 4 on RA. Pt does seem forgetful at times. AM assessment and vitals completed and put into flowsheets. AM medications given with no s/s of aspiration. Patient takes pills whole with water. Pt with no questions or concerns voiced to RN at this time. Fall precautions in place and call light within reach.   Vitals:    12/31/24 0743 12/31/24 0842 12/31/24 1046 12/31/24 1114   BP: (!) 143/70 (!) 172/94  (!) 155/81   Pulse: 88 87  78   Resp: 16   18   Temp: 97.6 °F (36.4 °C)   97.6 °F (36.4 °C)   TempSrc: Axillary   Oral   SpO2: 94% 94%  97%   Weight:       Height:   1.626 m (5' 4.02\")            
Pt son Morgan Batista notified of fall by charge RAUL Srinivasan.   
Pt with some nausea this am and pt states feels tired. Pt otherwise without complaints.   
Spoke with patients son, Morgan Batista (POA) about patients admission. He provided information to best ability. He is unsure of patients home medications and stated his sister would know more about this, and if he gets a hold of her, he will have her call.     Morgan did inform me that patient follows with the [place] [facility] [employer] of which daughter works for and see's patient 3-4x per week.  
Transport here to get patient. Pt's belongings sent with transport company. 2 belonging bags; shoes and clothes.   
Wound care completed per orders  
current functional level.  Activity Tolerance: Patient tolerated treatment well;Patient limited by endurance  Equipment Needed: No  Other: defer, pt owns RW and SPC    Plan  Physical Therapy Plan  General Plan: 3-5 times per week  Specific Instructions for Next Treatment: progress mobility as tolerated  Current Treatment Recommendations: Strengthening;Balance training;Functional mobility training;Transfer training;ROM;Endurance training;Gait training;Stair training;Neuromuscular re-education;Equipment evaluation, education, & procurement;Therapeutic activities;Co-Treatment;Home exercise program;Safety education & training    Restrictions  Restrictions/Precautions  Restrictions/Precautions: General Precautions  Activity Level: Up with Assist  Required Braces or Orthoses?: No  Position Activity Restriction  Other Position/Activity Restrictions: tele, avasure     Subjective   Orientation  Overall Orientation Status: Within Functional Limits  Orientation Level: Oriented X4    Objective  Vitals  Pulse: 87  Heart Rate Source: Monitor  BP: (!) 172/94  BP Location: Left upper arm  BP Method: Automatic  Patient Position: High fowlers  MAP (Calculated): 120  SpO2: 94 %  O2 Device: None (Room air)  Bed Mobility Training  Bed Mobility Training: Yes  Overall Level of Assistance: Stand-by assistance;Additional time  Interventions: Verbal cues;Safety awareness training;Weight shifting training/pressure relief  Supine to Sit: Stand-by assistance;Additional time  Sit to Supine: Other (comment) (pt left in chair, not assessed this date)  Scooting: Stand-by assistance (EOB)  Balance  Sitting: Intact  Standing: Impaired (RW)  Standing - Static: Constant support;Good  Standing - Dynamic: Constant support;Fair  Transfer Training  Transfer Training: Yes  Overall Level of Assistance: Moderate assistance;Assist X2;Additional time  Interventions: Verbal cues;Safety awareness training;Weight shifting training/pressure relief;Visual cues  Sit 
gtt    [] Anticoagulation (Heparin gtt or Coumadin - other anticoagulants in special circumstances)    [] Cardiac Medications (IV Amiodarone/Diltiazem, Tikosyn, etc)    [] Hemodialysis    [] Other -    [] Change in code status    [] Decision to escalate care    [] Major surgery/procedure with associated risk factors    --------------------------------------------------  C. Data (any 2)    [x] Data Review (any 3)    [x] Consultant notes from yesterday/today    [x] All available current labs reviewed interpreted for clinical significance    [x] Appropriate follow-up labs were ordered  [] Collateral history obtained     [x] Independent Interpretation of tests (any 1)    [x] Telemetry (Rhythm Strip) personally reviewed and interpreted        [] Imaging personally reviewed and interpreted     [x] Discussion (any 1)  [x] Multi-Disciplinary Rounds with Case Management  [] Discussed management of the case with           Labs:  Personally reviewed on 12/30/2024 and interpreted for clinical significance as documented above.     Recent Labs     12/28/24  1205 12/29/24  0640 12/30/24  1013   WBC 16.3* 10.3 9.0   HGB 12.2 11.0* 10.1*   HCT 37.7 33.2* 30.4*   * 394 355     Recent Labs     12/28/24  1205 12/29/24  0640 12/30/24  1013   * 136 135*   K 5.2* 4.0 4.0   CL 94* 104 105   CO2 14* 16* 17*   BUN 89* 86* 73*   CREATININE 3.7* 3.0* 2.4*   CALCIUM 10.8* 9.3 9.2   MG  --   --  1.73*   PHOS  --   --  3.9     No results for input(s): \"PROBNP\", \"TROPHS\" in the last 72 hours.  Recent Labs     12/29/24  0640   LABA1C 7.2     Recent Labs     12/28/24  1205 12/29/24  0640   AST 86* 55*   ALT 46* 38   BILITOT 0.3 <0.2   ALKPHOS 93 80     Recent Labs     12/28/24  1307   LACTA 1.4       Urine Cultures:   Lab Results   Component Value Date/Time    LABURIN >100,000 CFU/ml 12/28/2024 12:05 PM     Blood Cultures:   Lab Results   Component Value Date/Time    BC  12/28/2024 04:32 PM     No Growth to date.  Any change in status 
Total  How much help is needed for bathing (which includes washing, rinsing, drying)?: Total  How much help is needed for toileting (which includes using toilet, bedpan, or urinal)?: Total  How much help is needed for putting on and taking off regular upper body clothing?: A Lot  How much help is needed for taking care of personal grooming?: A Little  How much help for eating meals?: A Little  AM-[facility] Inpatient Daily Activity Raw Score: 11  AM-PAC Inpatient ADL T-Scale Score : 29.04  ADL Inpatient CMS 0-100% Score: 70.42  ADL Inpatient CMS G-Code Modifier : CL    Goals  Short Term Goals  Time Frame for Short Term Goals: 1/06/25, unless otherwise noted  Short Term Goal 1: Pt will complete functional transfer/toilet transfer with CGA  Short Term Goal 2: Pt will complete toileting with CGA  Short Term Goal 3: Pt will complete LB dressing with CGA  Short Term Goal 4: Pt will complete dynamic standing grooming tasks (2-4 tasks) with CGA  Short Term Goal 5: Pt will complete UE HEP 15x reps each by 1/04      Therapy Time   Individual Concurrent Group Co-treatment   Time In 0759         Time Out 0834         Minutes 35         Timed Code Treatment Minutes: 25 Minutes (10 minute eval)       Bee Ramos OT

## 2025-01-01 LAB
BACTERIA BLD CULT ORG #2: NORMAL
BACTERIA BLD CULT: NORMAL